# Patient Record
Sex: FEMALE | Race: WHITE | NOT HISPANIC OR LATINO | Employment: FULL TIME | URBAN - METROPOLITAN AREA
[De-identification: names, ages, dates, MRNs, and addresses within clinical notes are randomized per-mention and may not be internally consistent; named-entity substitution may affect disease eponyms.]

---

## 2019-02-05 ENCOUNTER — HOSPITAL ENCOUNTER (OUTPATIENT)
Dept: PERIOP | Facility: HOSPITAL | Age: 29
Setting detail: HOSPITAL OUTPATIENT SURGERY
Discharge: HOME | End: 2019-02-05
Attending: OBSTETRICS & GYNECOLOGY

## 2019-02-05 LAB — HCG UR QL: NEGATIVE

## 2019-09-20 ENCOUNTER — HOSPITAL ENCOUNTER (OUTPATIENT)
Dept: URGENT CARE | Facility: CLINIC | Age: 29
Discharge: HOME | End: 2019-09-20
Attending: EMERGENCY MEDICINE

## 2021-09-14 ENCOUNTER — TRANSCRIBE ORDERS (OUTPATIENT)
Dept: LAB | Facility: HOSPITAL | Age: 31
End: 2021-09-14

## 2021-09-14 ENCOUNTER — LAB (OUTPATIENT)
Dept: LAB | Facility: HOSPITAL | Age: 31
End: 2021-09-14

## 2021-09-14 DIAGNOSIS — Z01.818 PRE-OP TESTING: ICD-10-CM

## 2021-09-14 DIAGNOSIS — Z01.818 PRE-OP TESTING: Primary | ICD-10-CM

## 2021-09-14 PROCEDURE — U0004 COV-19 TEST NON-CDC HGH THRU: HCPCS

## 2021-09-15 LAB — SARS-COV-2 RNA NOSE QL NAA+PROBE: NOT DETECTED

## 2022-02-28 ENCOUNTER — TELEMEDICINE (OUTPATIENT)
Dept: FAMILY MEDICINE CLINIC | Facility: TELEHEALTH | Age: 32
End: 2022-02-28

## 2022-02-28 DIAGNOSIS — J30.2 SEASONAL ALLERGIC RHINITIS, UNSPECIFIED TRIGGER: ICD-10-CM

## 2022-02-28 DIAGNOSIS — B00.1 COLD SORE: Primary | ICD-10-CM

## 2022-02-28 PROCEDURE — 99213 OFFICE O/P EST LOW 20 MIN: CPT | Performed by: NURSE PRACTITIONER

## 2022-02-28 RX ORDER — METRONIDAZOLE 500 MG/1
TABLET ORAL
COMMUNITY
Start: 2022-02-25 | End: 2022-06-20

## 2022-02-28 RX ORDER — VALACYCLOVIR HYDROCHLORIDE 1 G/1
TABLET, FILM COATED ORAL
Qty: 4 TABLET | Refills: 1 | Status: SHIPPED | OUTPATIENT
Start: 2022-02-28 | End: 2022-06-20

## 2022-02-28 RX ORDER — QUETIAPINE FUMARATE 25 MG/1
TABLET, FILM COATED ORAL
COMMUNITY
Start: 2022-01-27 | End: 2022-06-20

## 2022-02-28 RX ORDER — FLUTICASONE PROPIONATE 50 MCG
2 SPRAY, SUSPENSION (ML) NASAL DAILY
Qty: 16 G | Refills: 0 | Status: SHIPPED | OUTPATIENT
Start: 2022-02-28 | End: 2022-06-20

## 2022-02-28 NOTE — PATIENT INSTRUCTIONS
Cold Sore    A cold sore, also called a fever blister, is a small, fluid-filled sore that forms inside the mouth or on the lips, gums, nose, chin, or cheeks. Cold sores can spread to other parts of the body, such as the eyes or fingers. In some people who have other medical conditions, cold sores can spread to multiple other body sites, including the genitals.  Cold sores can spread from person to person (are contagious) until the sores crust over completely. Most cold sores go away within 2 weeks.  What are the causes?  Cold sores are caused by an infection from a common type of herpes simplex virus (HSV-1). HSV-1 is closely related to the HSV-2virus, which is the virus that causes genital herpes, but these viruses are not the same. Once a person is infected with HSV-1, the virus remains permanently in the body.  HSV-1 is spread from person to person through close contact, such as through kissing, touching the affected area, or sharing personal items such as lip balm, razors, a drinking glass, or eating utensils.  What increases the risk?  You are more likely to develop this condition if you:  · Are tired, stressed, or sick.  · Are menstruating.  · Are pregnant.  · Take certain medicines.  · Are exposed to cold weather or too much sun.  What are the signs or symptoms?  Symptoms of a cold sore outbreak go through different stages. These are the stages of a cold sore:  · Tingling, itching, or burning is felt 1-2 days before the outbreak.  · Fluid-filled blisters appear on the lips, inside the mouth, on the nose, or on the cheeks.  · The blisters start to ooze clear fluid.  · The blisters dry up, and a yellow crust appears in their place.  · The crust falls off.  In some cases, other symptoms can develop during a cold sore outbreak. These can include:  · Fever.  · Sore throat.  · Headache.  · Muscle aches.  · Swollen neck glands.  How is this diagnosed?  This condition is diagnosed based on your medical history and a  physical exam. Your health care provider may do a blood test or may swab some fluid from your sore and then examine the swab in the lab.  How is this treated?  There is no cure for cold sores or HSV-1. There is also no vaccine for HSV-1. Most cold sores go away on their own without treatment within 2 weeks. Medicines cannot make the infection go away, but your health care provider may prescribe medicines to:  · Help relieve some of the pain associated with the sores.  · Work to stop the virus from multiplying.  · Shorten healing time.  Medicines may be in the form of creams, gels, pills, or a shot.  Follow these instructions at home:  Medicines  · Take or apply over-the-counter and prescription medicines only as told by your health care provider.  · Use a cotton-tip swab to apply creams or gels to your sores.  · Ask your health care provider if you can take lysine supplements. Research has found that lysine may help heal the cold sore faster and prevent outbreaks.  Sore care    · Do not touch the sores or pick the scabs.  · Wash your hands often. Do not touch your eyes without washing your hands first.  · Keep the sores clean and dry.  · If directed, apply ice to the sores:  ? Put ice in a plastic bag.  ? Place a towel between your skin and the bag.  ? Leave the ice on for 20 minutes, 2-3 times a day.    Eating and drinking  · Eat a soft, bland diet. Avoid eating hot, cold, or salty foods.  · Use a straw if it hurts to drink out of a glass.  · Eat foods that are rich in lysine, such as meat, fish, and dairy products.  · Avoid sugary foods, chocolates, nuts, and grains. These foods are rich in a nutrient called arginine, which can cause the virus to multiply.  Lifestyle  · Do not kiss, have oral sex, or share personal items until your sores heal.  · Stress, poor sleep, and being out in the sun can trigger outbreaks. Make sure you:  ? Do activities that help you relax, such as deep breathing exercises or  meditation.  ? Get enough sleep.  ? Apply sunscreen on your lips before you go out in the sun.  Contact a health care provider if:  · You have symptoms for more than 2 weeks.  · You have pus coming from the sores.  · You have redness that is spreading.  · You have pain or irritation in your eye.  · You get sores on your genitals.  · Your sores do not heal within 2 weeks.  · You have frequent cold sore outbreaks.  Get help right away if you have:  · A fever and your symptoms suddenly get worse.  · A headache and confusion.  · Fatigue or loss of appetite.  · A stiff neck or sensitivity to light.  Summary  · A cold sore, also called a fever blister, is a small, fluid-filled sore that forms inside the mouth or on the lips, gums, nose, chin, or cheeks.  · Most cold sores go away on their own without treatment within 2 weeks. Your health care provider may prescribe medicines to help relieve some of the pain, work to stop the virus from multiplying, and shorten healing time.  · Wash your hands often. Do not touch your eyes without washing your hands first.  · Do not kiss, have oral sex, or share personal items until your sores heal.  · Contact a health care provider if your sores do not heal within 2 weeks.  This information is not intended to replace advice given to you by your health care provider. Make sure you discuss any questions you have with your health care provider.  Document Revised: 04/08/2020 Document Reviewed: 05/20/2019  [employer] Patient Education © 2021 [employer]      Postnasal Drip  Postnasal drip is the feeling of mucus going down the back of your throat. Mucus is a slimy substance that moistens and cleans your nose and throat, as well as the air pockets in face bones near your forehead and cheeks (sinuses). Small amounts of mucus pass from your nose and sinuses down the back of your throat all the time. This is normal. When you produce too much mucus or the mucus gets too thick, you can feel  it.  Some common causes of postnasal drip include:  · Having more mucus because of:  ? A cold or the flu.  ? Allergies.  ? Cold air.  ? Certain medicines.  · Having more mucus that is thicker because of:  ? A sinus or nasal infection.  ? Dry air.  ? A food allergy.  Follow these instructions at home:  Relieving discomfort    · Gargle with a salt-water mixture 3-4 times a day or as needed. To make a salt-water mixture, completely dissolve ½-1 tsp of salt in 1 cup of warm water.  · If the air in your home is dry, use a humidifier to add moisture to the air.  · Use a saline spray or container (neti pot) to flush out the nose (nasal irrigation). These methods can help clear away mucus and keep the nasal passages moist.    General instructions  · Take over-the-counter and prescription medicines only as told by your health care provider.  · Follow instructions from your health care provider about eating or drinking restrictions. You may need to avoid caffeine.  · Avoid things that you know you are allergic to (allergens), like dust, mold, pollen, pets, or certain foods.  · Drink enough fluid to keep your urine pale yellow.  · Keep all follow-up visits as told by your health care provider. This is important.  Contact a health care provider if:  · You have a fever.  · You have a sore throat.  · You have difficulty swallowing.  · You have headache.  · You have sinus pain.  · You have a cough that does not go away.  · The mucus from your nose becomes thick and is green or yellow in color.  · You have cold or flu symptoms that last more than 10 days.  Summary  · Postnasal drip is the feeling of mucus going down the back of your throat.  · If your health care provider approves, use nasal irrigation or a nasal spray 2?4 times a day.  · Avoid things that you know you are allergic to (allergens), like dust, mold, pollen, pets, or certain foods.  This information is not intended to replace advice given to you by your health care  provider. Make sure you discuss any questions you have with your health care provider.  Document Revised: 04/22/2021 Document Reviewed: 04/02/2018  [employer] Patient Education © 2021 [employer]

## 2022-02-28 NOTE — PROGRESS NOTES
Mode of Visit: Video  Location of patient: home  You have chosen to receive care through a telehealth visit.  The patient has signed the video visit consent form.  The visit included audio and video interaction. No technical issues occurred during this visit.     Chief Complaint  Mouth Lesions and Nasal Congestion    Subjective          Tanika Ash presents to [facility]  Cold sore that presented yesterday with a prodrome of tingling and irritation. She started using Abreval yesterday, once in the afternoon. She found some mupirocin that she had from a previous condition and started using it. She has not had any relief. She has had some nasal congestion with postnasal drainage and feels like it is causing sore throat. She takes cetirizine daily for allergies.    Mouth Lesions   The current episode started yesterday. The problem has been gradually worsening. Nothing relieves the symptoms. Associated symptoms include a fever (for 2 night but resolved 2 days ago), congestion, mouth sores, rhinorrhea and sore throat.     Objective   Vital Signs:   There were no vitals taken for this visit.    Physical Exam   HENT:   Mouth/Throat: Oral lesions present.         Result Review :                 Assessment and Plan    Diagnoses and all orders for this visit:    1. Cold sore (Primary)  -     valACYclovir (Valtrex) 1000 MG tablet; Take 2 tabs every 12 hours x 2 doses  Dispense: 4 tablet; Refill: 1    2. Seasonal allergic rhinitis, unspecified trigger  -     fluticasone (Flonase) 50 MCG/ACT nasal spray; 2 sprays into the nostril(s) as directed by provider Daily for 30 days. Administer 2 sprays in each nostril for each dose.  Dispense: 16 g; Refill: 0      Continue Abreva, discontinue Mupirocin. See patient instructions.        I spent 20 minutes caring for Tanika on this date of service. This time includes time spent by me in the following activities:preparing for the visit, obtaining  and/or reviewing a separately obtained history, performing a medically appropriate examination and/or evaluation , counseling and educating the patient/family/caregiver, ordering medications, tests, or procedures, and documenting information in the medical record  Follow Up   Return if symptoms worsen or fail to improve.  Patient was given instructions and counseling regarding her condition or for health maintenance advice. Please see specific information pulled into the AVS if appropriate.

## 2022-06-13 ENCOUNTER — TRANSCRIBE ORDERS (OUTPATIENT)
Dept: ADMINISTRATIVE | Facility: HOSPITAL | Age: 32
End: 2022-06-13

## 2022-06-13 DIAGNOSIS — O26.841 UTERINE SIZE-DATE DISCREPANCY IN FIRST TRIMESTER: Primary | ICD-10-CM

## 2022-06-15 ENCOUNTER — HOSPITAL ENCOUNTER (EMERGENCY)
Facility: HOSPITAL | Age: 32
Discharge: HOME | End: 2022-06-15
Attending: EMERGENCY MEDICINE | Admitting: EMERGENCY MEDICINE

## 2022-06-15 ENCOUNTER — APPOINTMENT (OUTPATIENT)
Dept: ULTRASOUND IMAGING | Facility: HOSPITAL | Age: 32
End: 2022-06-15

## 2022-06-15 VITALS
DIASTOLIC BLOOD PRESSURE: 73 MMHG | TEMPERATURE: 98.6 F | HEIGHT: 62 IN | BODY MASS INDEX: 22.6 KG/M2 | RESPIRATION RATE: 20 BRPM | WEIGHT: 122.8 LBS | SYSTOLIC BLOOD PRESSURE: 101 MMHG | HEART RATE: 78 BPM | OXYGEN SATURATION: 99 %

## 2022-06-15 DIAGNOSIS — O03.4 INCOMPLETE MISCARRIAGE: Primary | ICD-10-CM

## 2022-06-15 LAB
ABO GROUP BLD: NORMAL
HCG INTACT+B SERPL-ACNC: NORMAL MIU/ML
HOLD SPECIMEN: NORMAL
HOLD SPECIMEN: NORMAL
RH BLD: POSITIVE
WHOLE BLOOD HOLD COAG: NORMAL
WHOLE BLOOD HOLD SPECIMEN: NORMAL

## 2022-06-15 PROCEDURE — 99282 EMERGENCY DEPT VISIT SF MDM: CPT

## 2022-06-15 PROCEDURE — 36415 COLL VENOUS BLD VENIPUNCTURE: CPT | Performed by: EMERGENCY MEDICINE

## 2022-06-15 PROCEDURE — 84702 CHORIONIC GONADOTROPIN TEST: CPT | Performed by: EMERGENCY MEDICINE

## 2022-06-15 PROCEDURE — 86900 BLOOD TYPING SEROLOGIC ABO: CPT | Performed by: NURSE PRACTITIONER

## 2022-06-15 PROCEDURE — 76817 TRANSVAGINAL US OBSTETRIC: CPT

## 2022-06-15 PROCEDURE — 86901 BLOOD TYPING SEROLOGIC RH(D): CPT | Performed by: NURSE PRACTITIONER

## 2022-06-15 NOTE — ED PROVIDER NOTES
Subjective   Patient presents to the emergency department today after being advised to do so by staff at [facility] where she was seen earlier for a ultrasound.  She was advised by the ultrasound tech that she did not detect a fetal heart beat.  Patient states that she just saw her OB on Friday who did detect a heartbeat.  She has not had any pain, cramping, or vaginal bleeding.  This is her second pregnancy.  Her first pregnancy was normal.      History provided by:  Patient   used: No        Review of Systems   Constitutional: Negative for chills and fever.   HENT: Negative for congestion, ear pain, rhinorrhea and sore throat.    Eyes: Negative for pain.   Respiratory: Negative for cough and shortness of breath.    Cardiovascular: Negative for chest pain.   Gastrointestinal: Negative for abdominal pain, diarrhea, nausea and vomiting.   Genitourinary: Negative for decreased urine volume, dysuria and flank pain.   Musculoskeletal: Negative for arthralgias and myalgias.   Skin: Negative for rash.   Neurological: Negative for seizures and headaches.   All other systems reviewed and are negative.      Past Medical History:   Diagnosis Date   • Allergic        Allergies   Allergen Reactions   • Augmentin [Amoxicillin-Pot Clavulanate] GI Intolerance       Past Surgical History:   Procedure Laterality Date   • CERVICAL CONIZATION     • KNEE ARTHROSCOPY Right     x 2   • LEEP         Family History   Problem Relation Age of Onset   • Diabetes Mother        Social History     Socioeconomic History   • Marital status: Single   Tobacco Use   • Smoking status: Former Smoker   • Smokeless tobacco: Never Used   Vaping Use   • Vaping Use: Former   Substance and Sexual Activity   • Alcohol use: Not Currently   • Drug use: Not Currently     Types: Marijuana     Comment: occ           Objective   Physical Exam  Vitals and nursing note reviewed.   Constitutional:       General: She is not in acute distress.      Appearance: Normal appearance. She is normal weight. She is not ill-appearing, toxic-appearing or diaphoretic.   HENT:      Head: Normocephalic and atraumatic.      Right Ear: External ear normal.      Left Ear: External ear normal.   Eyes:      General: No scleral icterus.     Conjunctiva/sclera: Conjunctivae normal.      Pupils: Pupils are equal, round, and reactive to light.   Cardiovascular:      Rate and Rhythm: Normal rate.   Pulmonary:      Effort: Pulmonary effort is normal. No respiratory distress.   Abdominal:      General: There is no distension.      Tenderness: There is no abdominal tenderness.   Musculoskeletal:         General: No swelling, tenderness, deformity or signs of injury. Normal range of motion.      Cervical back: Normal range of motion and neck supple.   Skin:     General: Skin is warm and dry.      Capillary Refill: Capillary refill takes less than 2 seconds.   Neurological:      General: No focal deficit present.      Mental Status: She is alert and oriented to person, place, and time.   Psychiatric:         Mood and Affect: Mood normal.         Behavior: Behavior normal.         Procedures           ED Course                                                 MDM  Number of Diagnoses or Management Options  Incomplete miscarriage: new and requires workup     Amount and/or Complexity of Data Reviewed  Clinical lab tests: reviewed  Tests in the radiology section of CPT®: reviewed and ordered  Independent visualization of images, tracings, or specimens: yes    Risk of Complications, Morbidity, and/or Mortality  Presenting problems: moderate  Diagnostic procedures: moderate  Management options: minimal  General comments: Patient was advised of her ultrasound results and told what to expect during her miscarriage.  She advises that she will call doctor Eng's office tomorrow to discuss the results and schedule an appointment.     Patient Progress  Patient progress: stable      Final diagnoses:    Incomplete miscarriage       ED Disposition  ED Disposition     ED Disposition   Discharge    Condition   Stable    Comment   --             Tammy Eng MD  [address]  [place] [id number]  [phone]    Schedule an appointment as soon as possible for a visit       Boston Luke MD  [address]  [facility] [address]  [phone]      As needed         Medication List      No changes were made to your prescriptions during this visit.          Yesenia Lucio, APRN  06/15/22 1495

## 2022-06-15 NOTE — ED NOTES
Pt reports that at her ultrasound today that they were unable to get the heart beat. She states that she has not had any bleeding.

## 2022-06-17 ENCOUNTER — PREP FOR SURGERY (OUTPATIENT)
Dept: OTHER | Facility: HOSPITAL | Age: 32
End: 2022-06-17

## 2022-06-17 ENCOUNTER — LAB (OUTPATIENT)
Dept: LAB | Facility: HOSPITAL | Age: 32
End: 2022-06-17

## 2022-06-17 DIAGNOSIS — O03.9 SAB (SPONTANEOUS ABORTION): Primary | ICD-10-CM

## 2022-06-17 PROCEDURE — U0004 COV-19 TEST NON-CDC HGH THRU: HCPCS

## 2022-06-18 LAB — SARS-COV-2 RNA PNL SPEC NAA+PROBE: NOT DETECTED

## 2022-06-21 ENCOUNTER — ANESTHESIA (OUTPATIENT)
Dept: PERIOP | Facility: HOSPITAL | Age: 32
End: 2022-06-21

## 2022-06-21 ENCOUNTER — ANESTHESIA EVENT (OUTPATIENT)
Dept: PERIOP | Facility: HOSPITAL | Age: 32
End: 2022-06-21

## 2022-06-21 ENCOUNTER — HOSPITAL ENCOUNTER (OUTPATIENT)
Facility: HOSPITAL | Age: 32
Setting detail: HOSPITAL OUTPATIENT SURGERY
Discharge: HOME | End: 2022-06-21
Attending: OBSTETRICS & GYNECOLOGY | Admitting: OBSTETRICS & GYNECOLOGY

## 2022-06-21 VITALS
HEIGHT: 62 IN | OXYGEN SATURATION: 97 % | HEART RATE: 72 BPM | WEIGHT: 130.95 LBS | TEMPERATURE: 97.1 F | DIASTOLIC BLOOD PRESSURE: 59 MMHG | BODY MASS INDEX: 24.1 KG/M2 | RESPIRATION RATE: 16 BRPM | SYSTOLIC BLOOD PRESSURE: 104 MMHG

## 2022-06-21 DIAGNOSIS — O03.9 SAB (SPONTANEOUS ABORTION): ICD-10-CM

## 2022-06-21 PROCEDURE — 25010000002 ONDANSETRON PER 1 MG: Performed by: NURSE ANESTHETIST, CERTIFIED REGISTERED

## 2022-06-21 PROCEDURE — 25010000002 DEXAMETHASONE PER 1 MG: Performed by: NURSE ANESTHETIST, CERTIFIED REGISTERED

## 2022-06-21 PROCEDURE — 88305 TISSUE EXAM BY PATHOLOGIST: CPT | Performed by: OBSTETRICS & GYNECOLOGY

## 2022-06-21 PROCEDURE — 25010000002 PROPOFOL 10 MG/ML EMULSION: Performed by: NURSE ANESTHETIST, CERTIFIED REGISTERED

## 2022-06-21 PROCEDURE — 25010000002 KETOROLAC TROMETHAMINE PER 15 MG: Performed by: NURSE ANESTHETIST, CERTIFIED REGISTERED

## 2022-06-21 PROCEDURE — 0 LIDOCAINE 1 % SOLUTION: Performed by: OBSTETRICS & GYNECOLOGY

## 2022-06-21 PROCEDURE — 25010000002 MIDAZOLAM PER 1 MG: Performed by: STUDENT IN AN ORGANIZED HEALTH CARE EDUCATION/TRAINING PROGRAM

## 2022-06-21 PROCEDURE — 25010000002 FENTANYL CITRATE (PF) 50 MCG/ML SOLUTION: Performed by: NURSE ANESTHETIST, CERTIFIED REGISTERED

## 2022-06-21 PROCEDURE — 0 MEPERIDINE PER 100 MG: Performed by: NURSE ANESTHETIST, CERTIFIED REGISTERED

## 2022-06-21 RX ORDER — KETOROLAC TROMETHAMINE 30 MG/ML
INJECTION, SOLUTION INTRAMUSCULAR; INTRAVENOUS AS NEEDED
Status: DISCONTINUED | OUTPATIENT
Start: 2022-06-21 | End: 2022-06-21 | Stop reason: SURG

## 2022-06-21 RX ORDER — PROMETHAZINE HYDROCHLORIDE 25 MG/1
25 SUPPOSITORY RECTAL ONCE AS NEEDED
Status: DISCONTINUED | OUTPATIENT
Start: 2022-06-21 | End: 2022-06-21 | Stop reason: HOSPADM

## 2022-06-21 RX ORDER — ONDANSETRON 2 MG/ML
4 INJECTION INTRAMUSCULAR; INTRAVENOUS ONCE AS NEEDED
Status: DISCONTINUED | OUTPATIENT
Start: 2022-06-21 | End: 2022-06-21 | Stop reason: HOSPADM

## 2022-06-21 RX ORDER — LIDOCAINE HYDROCHLORIDE 10 MG/ML
INJECTION, SOLUTION INFILTRATION; PERINEURAL AS NEEDED
Status: DISCONTINUED | OUTPATIENT
Start: 2022-06-21 | End: 2022-06-21 | Stop reason: HOSPADM

## 2022-06-21 RX ORDER — ONDANSETRON 2 MG/ML
INJECTION INTRAMUSCULAR; INTRAVENOUS AS NEEDED
Status: DISCONTINUED | OUTPATIENT
Start: 2022-06-21 | End: 2022-06-21 | Stop reason: SURG

## 2022-06-21 RX ORDER — ACETAMINOPHEN 500 MG
1000 TABLET ORAL ONCE
Status: COMPLETED | OUTPATIENT
Start: 2022-06-21 | End: 2022-06-21

## 2022-06-21 RX ORDER — LIDOCAINE HYDROCHLORIDE 20 MG/ML
INJECTION, SOLUTION EPIDURAL; INFILTRATION; INTRACAUDAL; PERINEURAL AS NEEDED
Status: DISCONTINUED | OUTPATIENT
Start: 2022-06-21 | End: 2022-06-21 | Stop reason: SURG

## 2022-06-21 RX ORDER — FENTANYL CITRATE 50 UG/ML
INJECTION, SOLUTION INTRAMUSCULAR; INTRAVENOUS AS NEEDED
Status: DISCONTINUED | OUTPATIENT
Start: 2022-06-21 | End: 2022-06-21 | Stop reason: SURG

## 2022-06-21 RX ORDER — IBUPROFEN 600 MG/1
600 TABLET ORAL EVERY 6 HOURS PRN
Qty: 20 TABLET | Refills: 0 | Status: SHIPPED | OUTPATIENT
Start: 2022-06-21 | End: 2022-06-26

## 2022-06-21 RX ORDER — MIDAZOLAM HYDROCHLORIDE 1 MG/ML
2 INJECTION INTRAMUSCULAR; INTRAVENOUS ONCE
Status: COMPLETED | OUTPATIENT
Start: 2022-06-21 | End: 2022-06-21

## 2022-06-21 RX ORDER — SCOLOPAMINE TRANSDERMAL SYSTEM 1 MG/1
1 PATCH, EXTENDED RELEASE TRANSDERMAL ONCE
Status: DISCONTINUED | OUTPATIENT
Start: 2022-06-21 | End: 2022-06-21 | Stop reason: HOSPADM

## 2022-06-21 RX ORDER — MEPERIDINE HYDROCHLORIDE 25 MG/ML
12.5 INJECTION INTRAMUSCULAR; INTRAVENOUS; SUBCUTANEOUS
Status: DISCONTINUED | OUTPATIENT
Start: 2022-06-21 | End: 2022-06-21 | Stop reason: HOSPADM

## 2022-06-21 RX ORDER — OXYCODONE HYDROCHLORIDE 5 MG/1
5 TABLET ORAL
Status: DISCONTINUED | OUTPATIENT
Start: 2022-06-21 | End: 2022-06-21 | Stop reason: HOSPADM

## 2022-06-21 RX ORDER — PROPOFOL 10 MG/ML
VIAL (ML) INTRAVENOUS AS NEEDED
Status: DISCONTINUED | OUTPATIENT
Start: 2022-06-21 | End: 2022-06-21 | Stop reason: SURG

## 2022-06-21 RX ORDER — DEXTROSE MONOHYDRATE 25 G/50ML
INJECTION, SOLUTION INTRAVENOUS AS NEEDED
Status: DISCONTINUED | OUTPATIENT
Start: 2022-06-21 | End: 2022-06-21 | Stop reason: SURG

## 2022-06-21 RX ORDER — KETAMINE HCL IN NACL, ISO-OSM 100MG/10ML
SYRINGE (ML) INJECTION AS NEEDED
Status: DISCONTINUED | OUTPATIENT
Start: 2022-06-21 | End: 2022-06-21 | Stop reason: SURG

## 2022-06-21 RX ORDER — SODIUM CHLORIDE, SODIUM LACTATE, POTASSIUM CHLORIDE, CALCIUM CHLORIDE 600; 310; 30; 20 MG/100ML; MG/100ML; MG/100ML; MG/100ML
9 INJECTION, SOLUTION INTRAVENOUS CONTINUOUS PRN
Status: DISCONTINUED | OUTPATIENT
Start: 2022-06-21 | End: 2022-06-21 | Stop reason: HOSPADM

## 2022-06-21 RX ORDER — PROMETHAZINE HYDROCHLORIDE 12.5 MG/1
25 TABLET ORAL ONCE AS NEEDED
Status: DISCONTINUED | OUTPATIENT
Start: 2022-06-21 | End: 2022-06-21 | Stop reason: HOSPADM

## 2022-06-21 RX ORDER — DEXAMETHASONE SODIUM PHOSPHATE 4 MG/ML
INJECTION, SOLUTION INTRA-ARTICULAR; INTRALESIONAL; INTRAMUSCULAR; INTRAVENOUS; SOFT TISSUE AS NEEDED
Status: DISCONTINUED | OUTPATIENT
Start: 2022-06-21 | End: 2022-06-21 | Stop reason: SURG

## 2022-06-21 RX ORDER — OXYTOCIN 10 [USP'U]/ML
INJECTION, SOLUTION INTRAMUSCULAR; INTRAVENOUS AS NEEDED
Status: DISCONTINUED | OUTPATIENT
Start: 2022-06-21 | End: 2022-06-21 | Stop reason: SURG

## 2022-06-21 RX ADMIN — DEXAMETHASONE SODIUM PHOSPHATE 8 MG: 4 INJECTION, SOLUTION INTRA-ARTICULAR; INTRALESIONAL; INTRAMUSCULAR; INTRAVENOUS; SOFT TISSUE at 15:50

## 2022-06-21 RX ADMIN — FENTANYL CITRATE 50 MCG: 50 INJECTION, SOLUTION INTRAMUSCULAR; INTRAVENOUS at 15:51

## 2022-06-21 RX ADMIN — Medication 25 MG: at 16:00

## 2022-06-21 RX ADMIN — DEXTROSE MONOHYDRATE 12.5 G: 500 INJECTION PARENTERAL at 15:50

## 2022-06-21 RX ADMIN — ONDANSETRON 4 MG: 2 INJECTION INTRAMUSCULAR; INTRAVENOUS at 15:50

## 2022-06-21 RX ADMIN — LIDOCAINE HYDROCHLORIDE 100 MG: 20 INJECTION, SOLUTION EPIDURAL; INFILTRATION; INTRACAUDAL; PERINEURAL at 15:51

## 2022-06-21 RX ADMIN — ACETAMINOPHEN 1000 MG: 500 TABLET ORAL at 09:37

## 2022-06-21 RX ADMIN — SCOPALAMINE 1 PATCH: 1 PATCH, EXTENDED RELEASE TRANSDERMAL at 09:38

## 2022-06-21 RX ADMIN — PROPOFOL 20 MG: 10 INJECTION, EMULSION INTRAVENOUS at 15:57

## 2022-06-21 RX ADMIN — KETOROLAC TROMETHAMINE 30 MG: 30 INJECTION, SOLUTION INTRAMUSCULAR; INTRAVENOUS at 15:50

## 2022-06-21 RX ADMIN — MIDAZOLAM HYDROCHLORIDE 2 MG: 1 INJECTION, SOLUTION INTRAMUSCULAR; INTRAVENOUS at 15:31

## 2022-06-21 RX ADMIN — FENTANYL CITRATE 50 MCG: 50 INJECTION, SOLUTION INTRAMUSCULAR; INTRAVENOUS at 16:17

## 2022-06-21 RX ADMIN — OXYTOCIN 10 UNITS: 10 INJECTION, SOLUTION INTRAMUSCULAR; INTRAVENOUS at 16:46

## 2022-06-21 RX ADMIN — PROPOFOL 60 MG: 10 INJECTION, EMULSION INTRAVENOUS at 16:20

## 2022-06-21 RX ADMIN — MEPERIDINE HYDROCHLORIDE 12.5 MG: 25 INJECTION INTRAMUSCULAR; INTRAVENOUS; SUBCUTANEOUS at 17:15

## 2022-06-21 RX ADMIN — PROPOFOL 250 MCG/KG/MIN: 10 INJECTION, EMULSION INTRAVENOUS at 15:51

## 2022-06-21 RX ADMIN — SODIUM CHLORIDE, POTASSIUM CHLORIDE, SODIUM LACTATE AND CALCIUM CHLORIDE 9 ML/HR: 600; 310; 30; 20 INJECTION, SOLUTION INTRAVENOUS at 09:37

## 2022-06-21 RX ADMIN — PROPOFOL 50 MG: 10 INJECTION, EMULSION INTRAVENOUS at 15:51

## 2022-06-21 RX ADMIN — Medication 25 MG: at 15:51

## 2022-06-21 NOTE — DISCHARGE INSTRUCTIONS
DISCHARGE INSTRUCTIONS  GYNECOLOGICAL  PROCEDURES      For your surgery you had:  General anesthesia (you may have a sore throat for the first 24 hours)  You received a medicated patch for nausea prevention today (behind your ear). It is recommended that you remove it 24-48 hours post-operatively. It must be removed within 72 hours.  You may experience dizziness, drowsiness, or lightheadedness for several hours following surgery.  Do not stay alone today or tonight.  Limit your activity for 24 hours.  Resume your diet slowly.  Follow any special dietary instructions you may have been given by your doctor.  You should not drive or operate machinery, drink alcohol, or sign legally binding documents for 24 hours or while you are taking pain medication.    NOTIFY YOUR DOCTOR IF YOU EXPERIENCE ANY OF THE FOLLOWING:  Temperature greater than 101 degrees Fahrenheit  Shaking Chills  Redness or excessive drainage from incision  Nausea, vomiting and/or pain that is not controlled by prescribed medications  Increase in bleeding or bleeding that is excessive  Unable to urinate in 6 hours after surgery  If unable to reach your doctor, please go to the closest Emergency Room [x] You may resume intercourse and the use of tampons as your physician has instructed you.  [x] Vaginal bleeding may be expected for several days with flow decreasing with time and never any heavier than a normal   period.  If you have foul smelling discharge, notify your physician.  Medications per physician instructions as indicated on Discharge Medication Information Sheet.      SPECIAL INSTRUCTIONS:             Last dose of pain medication was given at:  Tylenol (1000mg) last at 9:35am, may take tylenol next at any time if needed. Do not exceed 4000mg of tylenol in a 24 hour period.  Toradol last at 3:50pm, may take ibuprofen next at 9:50pm if needed.

## 2022-06-21 NOTE — ANESTHESIA POSTPROCEDURE EVALUATION
Patient: Tanika Ash    Procedure Summary     Date: 22 Room / Location: [facility] OR  [facility] MAIN OR    Anesthesia Start: 154 Anesthesia Stop:     Procedure: DILATATION AND CURETTAGE suction (N/A Vagina) Diagnosis:       SAB (spontaneous )      (SAB (spontaneous ) [O03.9])    Surgeons: Tammy Eng MD Provider: Anita Hallman MD    Anesthesia Type: general ASA Status: 1          Anesthesia Type: general    Vitals  Vitals Value Taken Time   BP 99/70 22 1736   Temp 36.4 °C (97.6 °F) 22 1730   Pulse 74 22 1738   Resp 15 22 1735   SpO2 93 % 22 1738   Vitals shown include unvalidated device data.        Post Anesthesia Care and Evaluation    Patient location during evaluation: bedside  Patient participation: complete - patient participated  Level of consciousness: awake  Pain score: 0  Pain management: adequate    Airway patency: patent  Anesthetic complications: No anesthetic complications  PONV Status: none  Cardiovascular status: acceptable and stable  Respiratory status: acceptable and room air  Hydration status: acceptable    Comments: An Anesthesiologist personally participated in the most demanding procedures (including induction and emergence if applicable) in the anesthesia plan, monitored the course of anesthesia administration at frequent intervals and remained physically present and available for immediate diagnosis and treatment of emergencies.

## 2022-06-21 NOTE — SIGNIFICANT NOTE
06/21/22 0957   Coping/Psychosocial   Observed Emotional State calm;quiet   Verbalized Emotional State acceptance   Trust Relationship/Rapport care explained;choices provided   Family/Support Persons significant other   Involvement in Care at bedside   Family/Support System Care support provided   Spiritual Care   Spiritual Care Visit Type initial   Spiritual Care Source  initiative;nurse referral   Receptivity to Spiritual Care visit welcomed   Spiritual Care Request loss support   Spiritual Care Interventions supportive conversation provided   Response to Spiritual Care thanks expressed   Use of Spiritual Resources non-Hinduism use of spiritual care   Spiritual Care Follow-Up follow-up, none required as presently assessed   Nurse referred pt to  for support and options for what to do with the babies remains. Visit welcomed. Parents decided they would like to cremate the baby and have the remains given to them. Supported conversation and decision.

## 2022-06-21 NOTE — ANESTHESIA PREPROCEDURE EVALUATION
Anesthesia Evaluation     Patient summary reviewed and Nursing notes reviewed   history of anesthetic complications: PONV  NPO Solid Status: > 8 hours  NPO Liquid Status: > 2 hours           Airway   Mallampati: II  TM distance: >3 FB  No difficulty expected  Dental - normal exam     Pulmonary - normal exam   (+) a smoker Former,   Cardiovascular - negative cardio ROS and normal exam  Exercise tolerance: good (4-7 METS)        Neuro/Psych- negative ROS  GI/Hepatic/Renal/Endo - negative ROS     Musculoskeletal (-) negative ROS    Abdominal  - normal exam   Substance History - negative use     OB/GYN    (+) Pregnant (spontaneous ),         Other - negative ROS       ROS/Med Hx Other: 11 weeks gestation                Anesthesia Plan    ASA 1     general     (Patient understands anesthesia not responsible for dental damage.)  intravenous induction     Anesthetic plan, risks, benefits, and alternatives have been provided, discussed and informed consent has been obtained with: patient.    Plan discussed with CRNA.        CODE STATUS:

## 2022-06-21 NOTE — OP NOTE
DILATATION AND CURETTAGE  Procedure Report    Patient Name:  Tanika Ash  YOB: 1990    Date of Surgery:  2022     Indications: SAB    Pre-op Diagnosis:   SAB (spontaneous ) [O03.9]       Post-Op Diagnosis Codes:     * SAB (spontaneous ) [O03.9]    Procedure/CPT® Codes:      Procedure(s):  DILATATION AND CURETTAGE suction    Staff:  Surgeon(s):  Tammy Eng MD         Anesthesia: Monitored Anesthesia Care    Estimated Blood Loss: 30 mL    Implants:    Nothing was implanted during the procedure    Specimen:          Specimens     ID Source Type Tests Collected By Collected At Frozen?    A Products of Conception Tissue · TISSUE PATHOLOGY EXAM   Tammy Eng MD 22 [id number]     This specimen was not marked as sent.              Findings: Uterus sounds to approximately 9 cm, products of conception removed from the uterine cavity.  Cervix stenotic    Complications: None    Description of Procedure: The appropriate consents were obtained.  The patient was taken to the operating suite.  Anesthesia was administered.  She was placed in dorsal lithotomy position utilizing candycane stirrups.  She was prepped and draped in the usual sterile fashion and had and out catheterization of her bladder.  Time out procedures were carried out.  She had a pelvic exam under anesthesia.  Findings as noted above.  The speculum was placed.  A single tooth tenaculum was placed.  The depth of the uterus was measured.  The appropriate size curet was selected.  The cervical  os was dilated to accommodate the curette.  Suction and sharp curette were alternated to assure complete evacuation of the uterus.  The tenaculum was removed.  Hemostasis at the tenaculum site with digital pressure.  The remainder of the estimates were removed.  The specimen was prepared for transport to pathology for analysis.  The patient was left in stable condition awaiting transport to the  PACU.          Tammy Eng MD     Date: 6/21/2022  Time: 18:01 EDT

## 2022-06-23 LAB
CYTO UR: NORMAL
LAB AP CASE REPORT: NORMAL
LAB AP CLINICAL INFORMATION: NORMAL
PATH REPORT.FINAL DX SPEC: NORMAL
PATH REPORT.GROSS SPEC: NORMAL

## 2022-06-29 ENCOUNTER — TELEPHONE (OUTPATIENT)
Dept: LACTATION | Facility: HOSPITAL | Age: 32
End: 2022-06-29

## 2022-06-29 NOTE — TELEPHONE ENCOUNTER
Nurse was able to discuss this patient's feelings around her recent pregnancy loss and she has reached out to her doctor and boyfriend and seems to be coping well but states she is grieving the loss. Nurse provided information on how  to contact her should she have more needs or questions.

## 2022-07-06 ENCOUNTER — APPOINTMENT (OUTPATIENT)
Dept: ULTRASOUND IMAGING | Facility: HOSPITAL | Age: 32
End: 2022-07-06

## 2022-08-07 ENCOUNTER — HOSPITAL ENCOUNTER (EMERGENCY)
Facility: HOSPITAL | Age: 32
Discharge: HOME | End: 2022-08-07
Attending: EMERGENCY MEDICINE | Admitting: EMERGENCY MEDICINE

## 2022-08-07 VITALS
HEIGHT: 62 IN | HEART RATE: 76 BPM | RESPIRATION RATE: 16 BRPM | OXYGEN SATURATION: 100 % | WEIGHT: 121.91 LBS | DIASTOLIC BLOOD PRESSURE: 73 MMHG | SYSTOLIC BLOOD PRESSURE: 111 MMHG | TEMPERATURE: 98 F | BODY MASS INDEX: 22.43 KG/M2

## 2022-08-07 DIAGNOSIS — L98.8 SKIN LESION OF BREAST: Primary | ICD-10-CM

## 2022-08-07 PROCEDURE — 99283 EMERGENCY DEPT VISIT LOW MDM: CPT

## 2022-08-07 PROCEDURE — 87593 ORTHOPOXVIRUS AMP PRB EACH: CPT | Performed by: NURSE PRACTITIONER

## 2022-08-08 NOTE — ED PROVIDER NOTES
Time: 22:17 EDT  Arrived by: Private vehicle  Chief Complaint: Skin lesion  History provided by: Patient  History is limited by: N/A    History of Present Illness:  Patient is a 32 y.o. year old female that presents to the emergency department with lesion on breast and patient's boyfriend was sent here to be screened for monkeypox so she needs screening as well since they have had close intimate contact      History provided by:  Patient  Rash  Location:  Torso  Torso rash location:  L breast  Quality: redness    Severity:  Mild  Onset quality:  Unable to specify  Duration:  1 day  Timing:  Constant  Progression:  Unchanged  Chronicity:  New  Context comment:  Patient boyfriend is being tested for monkey box and patient did notice 1 single lesion on her left breast and unsure where it came from  Relieved by:  Nothing  Worsened by:  Nothing  Ineffective treatments:  None tried  Associated symptoms: no abdominal pain, no diarrhea, no fatigue, no fever, no headaches, no hoarse voice, no induration, no joint pain, no myalgias, no nausea, no periorbital edema, no shortness of breath, no sore throat, no throat swelling, no tongue swelling, no URI, not vomiting and not wheezing      Similar Symptoms Previously: No  Recently seen: No      Patient Care Team  Primary Care Provider: Catina Luke    Past Medical History:     Allergies   Allergen Reactions   • Augmentin [Amoxicillin-Pot Clavulanate] GI Intolerance     Past Medical History:   Diagnosis Date   • Allergic    • PONV (postoperative nausea and vomiting)      Past Surgical History:   Procedure Laterality Date   • CERVICAL CONIZATION     • DILATATION AND CURETTAGE N/A 6/21/2022    Procedure: DILATATION AND CURETTAGE suction;  Surgeon: Tammy Eng MD;  Location: [facility] MAIN OR;  Service: Gynecology;  Laterality: N/A;   • KNEE ARTHROSCOPY Right     x 2   • LEEP       Family History   Problem Relation Age of Onset   • Diabetes Mother    • Malig Hyperthermia  "Neg Hx        Home Medications:  Prior to Admission medications    Not on File        Social History:   PT  reports that she has quit smoking. She has never used smokeless tobacco. She reports previous alcohol use. She reports previous drug use. Drug: Marijuana.    Record Review:  I have reviewed the patient's records in [place].     Review of Systems  Review of Systems   Constitutional: Negative for chills, fatigue and fever.   HENT: Negative for congestion, ear pain, hoarse voice and sore throat.    Eyes: Negative for pain.   Respiratory: Negative for cough, chest tightness, shortness of breath and wheezing.    Cardiovascular: Negative for chest pain.   Gastrointestinal: Negative for abdominal pain, diarrhea, nausea and vomiting.   Genitourinary: Negative for flank pain and hematuria.   Musculoskeletal: Negative for arthralgias, joint swelling and myalgias.   Skin: Positive for rash. Negative for pallor.   Neurological: Negative for seizures and headaches.   Hematological: Negative.    Psychiatric/Behavioral: Negative.    All other systems reviewed and are negative.       Physical Exam  /73 (BP Location: Right arm, Patient Position: Sitting)   Pulse 76   Temp 98 °F (36.7 °C) (Oral)   Resp 16   Ht 157.5 cm (62\")   Wt 55.3 kg (121 lb 14.6 oz)   LMP 07/21/2022 (Approximate) Comment: MISSED AB..  SpO2 100%   Breastfeeding Unknown   BMI 22.30 kg/m²     Physical Exam  Vitals and nursing note reviewed.   Constitutional:       Appearance: Normal appearance.   HENT:      Head: Atraumatic.      Nose: Nose normal.      Mouth/Throat:      Pharynx: Oropharynx is clear. No posterior oropharyngeal erythema.   Eyes:      Conjunctiva/sclera: Conjunctivae normal.   Cardiovascular:      Rate and Rhythm: Normal rate and regular rhythm.      Heart sounds: Normal heart sounds.   Pulmonary:      Effort: Pulmonary effort is normal.      Breath sounds: Normal breath sounds.   Musculoskeletal:         General: Normal range of " "motion.      Cervical back: Normal range of motion.   Skin:     General: Skin is warm and dry.      Findings: Lesion ( 1 single small pea-sized maculopapular erythematous lesion to left breast approximately 9:00 in location.  Nontender) present.   Neurological:      Mental Status: She is alert and oriented to person, place, and time.   Psychiatric:         Mood and Affect: Mood normal.         Behavior: Behavior normal.          ED Course  /73 (BP Location: Right arm, Patient Position: Sitting)   Pulse 76   Temp 98 °F (36.7 °C) (Oral)   Resp 16   Ht 157.5 cm (62\")   Wt 55.3 kg (121 lb 14.6 oz)   LMP 07/21/2022 (Approximate) Comment: MISSED AB..  SpO2 100%   Breastfeeding Unknown   BMI 22.30 kg/m²   Results for orders placed or performed during the hospital encounter of 06/21/22   Tissue Pathology Exam    Specimen: Products of Conception; Tissue   Result Value Ref Range    Case Report       Surgical Pathology Report                         Case: [id number]                                  Authorizing Provider:  Tammy Eng MD   Collected:           06/21/2022 04:32 PM          Ordering Location:     [facility] MAIN Received:            06/22/2022 04:05 AM                                 OR                                                                           Pathologist:           Sven Pena MD                                                            Specimen:    Products of Conception                                                                     Clinical Information      Final Diagnosis       Products of conception, removal:   - Chorionic villi present, consistent with products of conception        Gross Description       1. Products of Conception.  Products of conception: Received in formalin in a cloth collection stockinette are irregular fragments of reddish-tan soft tissue measuring 6 cm in greatest aggregate dimension.  No fetal parts are identified grossly.  " Rep 1A-1C.  CRE          Microscopic Description       Medications - No data to display  No results found.    Medical Decision Making:                     MDM  Number of Diagnoses or Management Options  Skin lesion of breast  Diagnosis management comments: I have spoken with the patient. I have explained the patient´s condition, diagnoses and treatment plan based on the information available to me at this time. I have answered the patient's questions and addressed any concerns. The patient has a good  understanding of the patient´s diagnosis, condition, and treatment plan as can be expected at this point. The vital signs have been stable. The patient´s condition is stable and appropriate for discharge from the emergency department.      The patient will pursue further outpatient evaluation with the primary care physician or other designated or consulting physician as outlined in the discharge instructions. They are agreeable to this plan of care and follow-up instructions have been explained in detail. The patient has received these instructions in written format and have expressed an understanding of the discharge instructions. The patient is aware that any significant change in condition or worsening of symptoms should prompt an immediate return to this or the closest emergency department or call to 911.         Amount and/or Complexity of Data Reviewed  Clinical lab tests: ordered    Risk of Complications, Morbidity, and/or Mortality  Presenting problems: minimal  Management options: minimal    Patient Progress  Patient progress: stable       Final diagnoses:   Skin lesion of breast        Disposition:  ED Disposition     ED Disposition   Discharge    Condition   Stable    Comment   --              Megan Reyes, APRN  08/07/22 0757

## 2022-08-12 LAB — ORTHOPOXVIRUS DNA: NOT DETECTED

## 2024-07-19 ENCOUNTER — HOSPITAL ENCOUNTER (EMERGENCY)
Facility: HOSPITAL | Age: 34
Discharge: HOME | End: 2024-07-19
Attending: EMERGENCY MEDICINE
Payer: MEDICAID

## 2024-07-19 VITALS
HEIGHT: 63 IN | HEART RATE: 56 BPM | DIASTOLIC BLOOD PRESSURE: 74 MMHG | WEIGHT: 113.1 LBS | OXYGEN SATURATION: 98 % | TEMPERATURE: 98 F | BODY MASS INDEX: 20.04 KG/M2 | SYSTOLIC BLOOD PRESSURE: 100 MMHG | RESPIRATION RATE: 18 BRPM

## 2024-07-19 DIAGNOSIS — K62.5 RECTAL BLEEDING: Primary | ICD-10-CM

## 2024-07-19 LAB
BACTERIA UR QL AUTO: ABNORMAL /HPF
BASOPHILS # BLD AUTO: 0.05 10*3/MM3 (ref 0–0.2)
BASOPHILS NFR BLD AUTO: 0.6 % (ref 0–1.5)
BILIRUB UR QL STRIP: ABNORMAL
CLARITY UR: ABNORMAL
COLOR UR: ABNORMAL
DEPRECATED RDW RBC AUTO: 47.8 FL (ref 37–54)
EOSINOPHIL # BLD AUTO: 0.18 10*3/MM3 (ref 0–0.4)
EOSINOPHIL NFR BLD AUTO: 2 % (ref 0.3–6.2)
ERYTHROCYTE [DISTWIDTH] IN BLOOD BY AUTOMATED COUNT: 13.7 % (ref 12.3–15.4)
GLUCOSE UR STRIP-MCNC: NEGATIVE MG/DL
HCG INTACT+B SERPL-ACNC: <0.5 MIU/ML
HCT VFR BLD AUTO: 41.6 % (ref 34–46.6)
HEMOCCULT STL QL IA: NEGATIVE
HGB BLD-MCNC: 14 G/DL (ref 12–15.9)
HGB UR QL STRIP.AUTO: NEGATIVE
HOLD SPECIMEN: NORMAL
HOLD SPECIMEN: NORMAL
HYALINE CASTS UR QL AUTO: ABNORMAL /LPF
IMM GRANULOCYTES # BLD AUTO: 0.02 10*3/MM3 (ref 0–0.05)
IMM GRANULOCYTES NFR BLD AUTO: 0.2 % (ref 0–0.5)
KETONES UR QL STRIP: ABNORMAL
LEUKOCYTE ESTERASE UR QL STRIP.AUTO: ABNORMAL
LYMPHOCYTES # BLD AUTO: 2.34 10*3/MM3 (ref 0.7–3.1)
LYMPHOCYTES NFR BLD AUTO: 26 % (ref 19.6–45.3)
MCH RBC QN AUTO: 31.8 PG (ref 26.6–33)
MCHC RBC AUTO-ENTMCNC: 33.7 G/DL (ref 31.5–35.7)
MCV RBC AUTO: 94.5 FL (ref 79–97)
MONOCYTES # BLD AUTO: 0.58 10*3/MM3 (ref 0.1–0.9)
MONOCYTES NFR BLD AUTO: 6.4 % (ref 5–12)
NEUTROPHILS NFR BLD AUTO: 5.83 10*3/MM3 (ref 1.7–7)
NEUTROPHILS NFR BLD AUTO: 64.8 % (ref 42.7–76)
NITRITE UR QL STRIP: NEGATIVE
NRBC BLD AUTO-RTO: 0 /100 WBC (ref 0–0.2)
PH UR STRIP.AUTO: 6 [PH] (ref 5–8)
PLATELET # BLD AUTO: 303 10*3/MM3 (ref 140–450)
PMV BLD AUTO: 11 FL (ref 6–12)
PROT UR QL STRIP: ABNORMAL
RBC # BLD AUTO: 4.4 10*6/MM3 (ref 3.77–5.28)
RBC # UR STRIP: ABNORMAL /HPF
REF LAB TEST METHOD: ABNORMAL
SP GR UR STRIP: >=1.03 (ref 1–1.03)
SQUAMOUS #/AREA URNS HPF: ABNORMAL /HPF
UROBILINOGEN UR QL STRIP: ABNORMAL
WBC # UR STRIP: ABNORMAL /HPF
WBC NRBC COR # BLD AUTO: 9 10*3/MM3 (ref 3.4–10.8)
WHOLE BLOOD HOLD COAG: NORMAL
WHOLE BLOOD HOLD SPECIMEN: NORMAL

## 2024-07-19 PROCEDURE — 85025 COMPLETE CBC W/AUTO DIFF WBC: CPT | Performed by: EMERGENCY MEDICINE

## 2024-07-19 PROCEDURE — 87086 URINE CULTURE/COLONY COUNT: CPT | Performed by: EMERGENCY MEDICINE

## 2024-07-19 PROCEDURE — 83605 ASSAY OF LACTIC ACID: CPT | Performed by: EMERGENCY MEDICINE

## 2024-07-19 PROCEDURE — 99283 EMERGENCY DEPT VISIT LOW MDM: CPT

## 2024-07-19 PROCEDURE — 84702 CHORIONIC GONADOTROPIN TEST: CPT | Performed by: EMERGENCY MEDICINE

## 2024-07-19 PROCEDURE — 80053 COMPREHEN METABOLIC PANEL: CPT | Performed by: EMERGENCY MEDICINE

## 2024-07-19 PROCEDURE — 82274 ASSAY TEST FOR BLOOD FECAL: CPT | Performed by: EMERGENCY MEDICINE

## 2024-07-19 PROCEDURE — 81001 URINALYSIS AUTO W/SCOPE: CPT | Performed by: EMERGENCY MEDICINE

## 2024-07-19 RX ORDER — SODIUM CHLORIDE 0.9 % (FLUSH) 0.9 %
10 SYRINGE (ML) INJECTION AS NEEDED
Status: DISCONTINUED | OUTPATIENT
Start: 2024-07-19 | End: 2024-07-19 | Stop reason: HOSPADM

## 2024-07-19 RX ORDER — OMEPRAZOLE 40 MG/1
40 CAPSULE, DELAYED RELEASE ORAL DAILY
Qty: 14 CAPSULE | Refills: 0 | Status: SHIPPED | OUTPATIENT
Start: 2024-07-19

## 2024-07-19 RX ORDER — DICYCLOMINE HCL 20 MG
20 TABLET ORAL EVERY 8 HOURS PRN
Qty: 12 TABLET | Refills: 0 | Status: SHIPPED | OUTPATIENT
Start: 2024-07-19

## 2024-07-19 RX ORDER — ONDANSETRON 4 MG/1
4 TABLET, ORALLY DISINTEGRATING ORAL EVERY 6 HOURS PRN
Qty: 15 TABLET | Refills: 0 | Status: SHIPPED | OUTPATIENT
Start: 2024-07-19

## 2024-07-20 LAB
ALBUMIN SERPL-MCNC: 4.2 G/DL (ref 3.5–5.2)
ALBUMIN/GLOB SERPL: 1.6 G/DL
ALP SERPL-CCNC: 82 U/L (ref 39–117)
ALT SERPL W P-5'-P-CCNC: 12 U/L (ref 1–33)
ANION GAP SERPL CALCULATED.3IONS-SCNC: 9.6 MMOL/L (ref 5–15)
AST SERPL-CCNC: 14 U/L (ref 1–32)
BASOPHILS # BLD AUTO: 0.03 10*3/MM3 (ref 0–0.2)
BASOPHILS NFR BLD AUTO: 0.3 % (ref 0–1.5)
BILIRUB SERPL-MCNC: 0.5 MG/DL (ref 0–1.2)
BUN SERPL-MCNC: 11 MG/DL (ref 6–20)
BUN/CREAT SERPL: 18 (ref 7–25)
CALCIUM SPEC-SCNC: 8.9 MG/DL (ref 8.6–10.5)
CHLORIDE SERPL-SCNC: 106 MMOL/L (ref 98–107)
CO2 SERPL-SCNC: 23.4 MMOL/L (ref 22–29)
CREAT SERPL-MCNC: 0.61 MG/DL (ref 0.57–1)
D-LACTATE SERPL-SCNC: 0.7 MMOL/L (ref 0.5–2)
DEPRECATED RDW RBC AUTO: 47.4 FL (ref 37–54)
EGFRCR SERPLBLD CKD-EPI 2021: 119.7 ML/MIN/1.73
EOSINOPHIL # BLD AUTO: 0.06 10*3/MM3 (ref 0–0.4)
EOSINOPHIL NFR BLD AUTO: 0.6 % (ref 0.3–6.2)
ERYTHROCYTE [DISTWIDTH] IN BLOOD BY AUTOMATED COUNT: 13.5 % (ref 12.3–15.4)
GLOBULIN UR ELPH-MCNC: 2.6 GM/DL
GLUCOSE SERPL-MCNC: 90 MG/DL (ref 65–99)
HCT VFR BLD AUTO: 41.1 % (ref 34–46.6)
HGB BLD-MCNC: 13.7 G/DL (ref 12–15.9)
IMM GRANULOCYTES # BLD AUTO: 0.02 10*3/MM3 (ref 0–0.05)
IMM GRANULOCYTES NFR BLD AUTO: 0.2 % (ref 0–0.5)
LYMPHOCYTES # BLD AUTO: 1.79 10*3/MM3 (ref 0.7–3.1)
LYMPHOCYTES NFR BLD AUTO: 18.7 % (ref 19.6–45.3)
MCH RBC QN AUTO: 31.5 PG (ref 26.6–33)
MCHC RBC AUTO-ENTMCNC: 33.3 G/DL (ref 31.5–35.7)
MCV RBC AUTO: 94.5 FL (ref 79–97)
MONOCYTES # BLD AUTO: 0.41 10*3/MM3 (ref 0.1–0.9)
MONOCYTES NFR BLD AUTO: 4.3 % (ref 5–12)
NEUTROPHILS NFR BLD AUTO: 7.28 10*3/MM3 (ref 1.7–7)
NEUTROPHILS NFR BLD AUTO: 75.9 % (ref 42.7–76)
NRBC BLD AUTO-RTO: 0 /100 WBC (ref 0–0.2)
PLATELET # BLD AUTO: 259 10*3/MM3 (ref 140–450)
PMV BLD AUTO: 12.3 FL (ref 6–12)
POTASSIUM SERPL-SCNC: 3.2 MMOL/L (ref 3.5–5.2)
PROT SERPL-MCNC: 6.8 G/DL (ref 6–8.5)
RBC # BLD AUTO: 4.35 10*6/MM3 (ref 3.77–5.28)
SODIUM SERPL-SCNC: 139 MMOL/L (ref 136–145)
WBC NRBC COR # BLD AUTO: 9.59 10*3/MM3 (ref 3.4–10.8)

## 2024-07-20 NOTE — ED PROVIDER NOTES
Time: 8:20 PM EDT  Date of encounter:  7/19/2024  Independent Historian/Clinical History and Information was obtained by:   Patient    History is limited by: N/A    Chief Complaint: Abdominal pain, nausea and diarrhea      History of Present Illness:  Patient is a 34 y.o. year old female who presents to the emergency department for evaluation of abdominal pain, diarrhea.  All symptoms started at 05 100 this morning.  Patient denies vomiting.  She has chronic abdominal issues with recurrent diarrhea and constipation and wonders if she has IBS.  She states she drinks almost no water throughout the day and every time she brings this up to her primary care provider they put her off and tell her to drink water.  This episode concerned her more because she has not had bleeding in the past.  She had 2 episodes of bright red blood per rectum today.  No melena.  No vomiting blood.  Afebrile.  Her pain is currently improved, located in the midline abdomen above the umbilicus.    HPI    Patient Care Team  Primary Care Provider: [place] Luke MD    Past Medical History:     Allergies   Allergen Reactions    Augmentin [Amoxicillin-Pot Clavulanate] GI Intolerance     Past Medical History:   Diagnosis Date    Allergic     PONV (postoperative nausea and vomiting)      Past Surgical History:   Procedure Laterality Date    CERVICAL CONIZATION      DILATATION AND CURETTAGE N/A 6/21/2022    Procedure: DILATATION AND CURETTAGE suction;  Surgeon: Tammy Eng MD;  Location: [facility];  Service: Gynecology;  Laterality: N/A;    KNEE ARTHROSCOPY Right     x 2    LEEP       Family History   Problem Relation Age of Onset    Diabetes Mother     Malig Hyperthermia Neg Hx        Home Medications:  Prior to Admission medications    Medication Sig Start Date End Date Taking? Authorizing Provider   mupirocin (BACTROBAN) 2 % ointment Apply 1 application topically to the appropriate area as directed 3 (Three) Times a Day.  "8/7/22   Megan Reyes APRN        Social History:   Social History     Tobacco Use    Smoking status: Former    Smokeless tobacco: Never   Vaping Use    Vaping status: Former   Substance Use Topics    Alcohol use: Not Currently    Drug use: Not Currently     Types: Marijuana     Comment: occ         Review of Systems:  Review of Systems   Constitutional:  Negative for chills and fever.   HENT:  Negative for congestion, rhinorrhea and sore throat.    Eyes:  Negative for photophobia.   Respiratory:  Negative for apnea, cough, chest tightness and shortness of breath.    Cardiovascular:  Negative for chest pain and palpitations.   Gastrointestinal:  Positive for abdominal pain, blood in stool and diarrhea. Negative for nausea and vomiting.   Endocrine: Negative.    Genitourinary:  Negative for difficulty urinating and dysuria.   Musculoskeletal:  Negative for back pain, joint swelling and myalgias.   Skin:  Negative for color change and wound.   Allergic/Immunologic: Negative.    Neurological:  Negative for seizures and headaches.   Psychiatric/Behavioral: Negative.     All other systems reviewed and are negative.       Physical Exam:  /74 (BP Location: Right arm, Patient Position: Lying)   Pulse 56   Temp 98 °F (36.7 °C) (Oral)   Resp 18   Ht 160 cm (63\")   Wt 51.3 kg (113 lb 1.5 oz)   SpO2 98%   BMI 20.03 kg/m²     Physical Exam  Vitals and nursing note reviewed.   Constitutional:       General: She is awake.      Appearance: Normal appearance. She is well-developed.   HENT:      Head: Normocephalic and atraumatic.      Nose: Nose normal.      Mouth/Throat:      Mouth: Mucous membranes are moist.   Eyes:      Extraocular Movements: Extraocular movements intact.      Pupils: Pupils are equal, round, and reactive to light.   Cardiovascular:      Rate and Rhythm: Normal rate and regular rhythm.      Heart sounds: Normal heart sounds.   Pulmonary:      Effort: Pulmonary effort is normal. No respiratory " distress.      Breath sounds: Normal breath sounds. No wheezing, rhonchi or rales.   Abdominal:      General: Bowel sounds are normal.      Palpations: Abdomen is soft.      Tenderness: There is abdominal tenderness in the epigastric area. There is no guarding or rebound.      Comments: No rigidity   Musculoskeletal:         General: No tenderness. Normal range of motion.      Cervical back: Normal range of motion and neck supple.   Skin:     General: Skin is warm and dry.      Coloration: Skin is not jaundiced.   Neurological:      General: No focal deficit present.      Mental Status: She is alert. Mental status is at baseline.   Psychiatric:         Mood and Affect: Mood normal.                  Procedures:  Procedures      Medical Decision Making:      Comorbidities that affect care:    Seasonal allergies, postoperative nausea and vomiting    External Notes reviewed:    Previous Clinic Note: [facility] OB/GYN admission 9/17/2023.  Vaginal delivery      The following orders were placed and all results were independently analyzed by me:  Orders Placed This Encounter   Procedures    [place] Draw    Comprehensive Metabolic Panel    Lipase    Urinalysis With Microscopic If Indicated (No Culture) - Urine, Clean Catch    hCG, Quantitative, Pregnancy    CBC Auto Differential    Occult Blood, Fecal By Immunoassay - Stool, Per Rectum    Urinalysis, Microscopic Only - Urine, Clean Catch    NPO Diet NPO Type: Strict NPO    Undress & Gown    Insert Peripheral IV    CBC & Differential    Green Top (Gel)    Lavender Top    Gold Top - SST    Light Blue Top       Medications Given in the Emergency Department:  Medications   sodium chloride 0.9 % flush 10 mL (has no administration in time range)        ED Course:    ED Course as of 07/19/24 2050 Fri Jul 19, 2024 2045 Offered CT scan, however the patient declines as she does not want to have another 1 to 2-hour wait here in the emergency department.  Her pain is  improved here.  It is vague and mild mainly above the umbilicus in the midline.  She has no focal tenderness concerning for diverticulitis, appendicitis or any other emergent abdominal pathology. [RP]      ED Course User Index  [RP] Guy Salguero MD       Labs:    Lab Results (last 24 hours)       Procedure Component Value Units Date/Time    Urinalysis With Microscopic If Indicated (No Culture) - Urine, Clean Catch [[id number]]  (Abnormal) Collected: 07/19/24 2000    Specimen: Urine, Clean Catch Updated: 07/19/24 2025     Color, UA Dark Yellow     Appearance, UA Cloudy     pH, UA 6.0     Specific Gravity, UA >=1.030     Glucose, UA Negative     Ketones, UA 15 mg/dL (1+)     Bilirubin, UA Moderate (2+)     Blood, UA Negative     Protein, UA 30 mg/dL (1+)     Leuk Esterase, UA Trace     Nitrite, UA Negative     Urobilinogen, UA 1.0 E.U./dL    Urinalysis, Microscopic Only - Urine, Clean Catch [[id number]]  (Abnormal) Collected: 07/19/24 2000    Specimen: Urine, Clean Catch Updated: 07/19/24 2025     RBC, UA 6-10 /HPF      WBC, UA 3-5 /HPF      Bacteria, UA 2+ /HPF      Squamous Epithelial Cells, UA 13-20 /HPF      Hyaline Casts, UA 7-12 /LPF      Methodology Automated Microscopy    Occult Blood, Fecal By Immunoassay - Stool, Per Rectum [[id number]]  (Normal) Collected: 07/19/24 2005    Specimen: Stool from Per Rectum Updated: 07/19/24 2034     Occult Blood, Fecal by Immunoassay Negative    CBC & Differential [[id number]]  (Normal) Collected: 07/19/24 2013    Specimen: Blood Updated: 07/19/24 2023    Narrative:      The following orders were created for panel order CBC & Differential.  Procedure                               Abnormality         Status                     ---------                               -----------         ------                     CBC Auto Differential[[id number]]        Normal              Final result                 Please view results for these tests on the individual orders.     Comprehensive Metabolic Panel [[id number]] Collected: 07/19/24 2013    Specimen: Blood Updated: 07/19/24 2019    Lipase [[id number]] Collected: 07/19/24 2013    Specimen: Blood Updated: 07/19/24 2019    hCG, Quantitative, Pregnancy [[id number]] Collected: 07/19/24 2013    Specimen: Blood Updated: 07/19/24 2019    CBC Auto Differential [[id number]]  (Normal) Collected: 07/19/24 2013    Specimen: Blood Updated: 07/19/24 2023     WBC 9.00 10*3/mm3      RBC 4.40 10*6/mm3      Hemoglobin 14.0 g/dL      Hematocrit 41.6 %      MCV 94.5 fL      MCH 31.8 pg      MCHC 33.7 g/dL      RDW 13.7 %      RDW-SD 47.8 fl      MPV 11.0 fL      Platelets 303 10*3/mm3      Neutrophil % 64.8 %      Lymphocyte % 26.0 %      Monocyte % 6.4 %      Eosinophil % 2.0 %      Basophil % 0.6 %      Immature Grans % 0.2 %      Neutrophils, Absolute 5.83 10*3/mm3      Lymphocytes, Absolute 2.34 10*3/mm3      Monocytes, Absolute 0.58 10*3/mm3      Eosinophils, Absolute 0.18 10*3/mm3      Basophils, Absolute 0.05 10*3/mm3      Immature Grans, Absolute 0.02 10*3/mm3      nRBC 0.0 /100 WBC              Imaging:    No Radiology Exams Resulted Within Past 24 Hours      Differential Diagnosis and Discussion:    Abdominal Pain: Based on the patient's signs and symptoms, I considered abdominal aortic aneurysm, small bowel obstruction, pancreatitis, acute cholecystitis, acute appendecitis, peptic ulcer disease, gastritis, colitis, endocrine disorders, irritable bowel syndrome and other differential diagnosis an etiology of the patient's abdominal pain.  GI Bleeding: Differential diagnosis includes but is not limited to gastritis, gastric ulcer, stress ulcer, duodenitis, Alexandra-Aguayo tears, esophageal varices, angiodysplasia, aortic enteric fistula, hematologic issues including thrombocytopenia, GI neoplasm, ulcerative colitis, Crohn's disease, diverticulosis, diverticulitis, hemorrhoids, aortic aneurysm, and polyps    All labs were reviewed and interpreted by  me.    [facility]               Patient Care Considerations:    CT ABDOMEN AND PELVIS: I considered ordering a CT scan of the abdomen and pelvis however patient declined CT scan.  I am comfortable with this plan as she is hemodynamically stable with a normal white blood cell count.  Her history/physical exam not concerning for emergent abdominal pathology.      Consultants/Shared Management Plan:    None    Social Determinants of Health:    Patient is independent, reliable, and has access to care.       Disposition and Care Coordination:    Discharged: The patient is suitable and stable for discharge with no need for consideration of admission.    I have explained the patient´s condition, diagnoses and treatment plan based on the information available to me at this time. I have answered questions and addressed any concerns. The patient has a good  understanding of the patient´s diagnosis, condition, and treatment plan as can be expected at this point. The vital signs have been stable. The patient´s condition is stable and appropriate for discharge from the emergency department.      The patient will pursue further outpatient evaluation with the primary care physician or other designated or consulting physician as outlined in the discharge instructions. They are agreeable to this plan of care and follow-up instructions have been explained in detail. The patient has received these instructions in written format and has expressed an understanding of the discharge instructions. The patient is aware that any significant change in condition or worsening of symptoms should prompt an immediate return to this or the closest emergency department or call to 911.    Final diagnoses:   Rectal bleeding        ED Disposition       ED Disposition   Discharge    Condition   Stable    Comment   --               This medical record created using voice recognition software.             Guy Salguero MD  07/19/24 [id number]

## 2024-07-20 NOTE — DISCHARGE INSTRUCTIONS
Return emergency department immediately for fever, uncontrolled abdominal pain, vomiting blood, black tar-like stools.  Stay well-hydrated.

## 2024-07-21 LAB — BACTERIA SPEC AEROBE CULT: NORMAL

## 2024-07-28 ENCOUNTER — TELEMEDICINE (OUTPATIENT)
Dept: FAMILY MEDICINE CLINIC | Facility: TELEHEALTH | Age: 34
End: 2024-07-28

## 2024-07-28 DIAGNOSIS — I88.9 LYMPHADENITIS: ICD-10-CM

## 2024-07-28 DIAGNOSIS — J03.90 TONSILLITIS: Primary | ICD-10-CM

## 2024-07-28 PROBLEM — F17.200 NICOTINE DEPENDENCE: Status: ACTIVE | Noted: 2023-03-22

## 2024-07-28 PROBLEM — F32.A DEPRESSION: Status: ACTIVE | Noted: 2023-03-22

## 2024-07-28 PROBLEM — O34.42 HISTORY OF LOOP ELECTROSURGICAL EXCISION PROCEDURE (LEEP) OF CERVIX AFFECTING PREGNANCY IN SECOND TRIMESTER: Status: ACTIVE | Noted: 2023-04-19

## 2024-07-28 PROBLEM — R12 HEARTBURN: Status: ACTIVE | Noted: 2023-03-22

## 2024-07-28 PROBLEM — Z98.890 HISTORY OF LOOP ELECTROSURGICAL EXCISION PROCEDURE (LEEP) OF CERVIX AFFECTING PREGNANCY IN SECOND TRIMESTER: Status: ACTIVE | Noted: 2023-04-19

## 2024-07-28 PROCEDURE — 99213 OFFICE O/P EST LOW 20 MIN: CPT | Performed by: NURSE PRACTITIONER

## 2024-07-28 RX ORDER — CLONAZEPAM 1 MG/1
1 TABLET ORAL 2 TIMES DAILY PRN
COMMUNITY

## 2024-07-28 RX ORDER — AMOXICILLIN 500 MG/1
500 CAPSULE ORAL 2 TIMES DAILY
Qty: 20 CAPSULE | Refills: 0 | Status: SHIPPED | OUTPATIENT
Start: 2024-07-28 | End: 2024-08-07

## 2024-07-28 RX ORDER — IBUPROFEN 600 MG/1
TABLET ORAL
COMMUNITY
Start: 2024-07-09

## 2024-07-28 NOTE — PROGRESS NOTES
You have chosen to receive care through a telehealth visit.  Do you consent to use a video/audio connection for your medical care today? Yes     CHIEF COMPLAINT  Chief Complaint   Patient presents with    Sore Throat         HPI  Tanika Ash is a 34 y.o. female  presents with complaint of throat pain, ear pain on the right side. Reports the symptoms started 3 days ago. No fever or chills. Reports 2 weeks ago it was sore throat on the left side and now it has returned on the right side 3 days ago. + lymph node on the right swollen. Reports she is having itching and some pain in the right ear. Reports she has some white stuff on her throat. Reports she has tried Mucinex Max that hasn't helped. Reports she had a tooth removed on the left side 2 weeks ago but there is no swelling or pain in the area. Patient reports she is not having any issues with her thyroid being swollen she meant lymph nodes.      Review of Systems   Constitutional:  Negative for chills, fatigue and fever.   HENT:  Positive for ear pain and sore throat. Negative for congestion, ear discharge, sinus pressure and sinus pain.         Hurts to swallow   Respiratory:  Negative for cough, chest tightness, shortness of breath and wheezing.    Cardiovascular:  Negative for chest pain.   Gastrointestinal:  Negative for abdominal pain, diarrhea, nausea and vomiting.   Musculoskeletal:  Negative for back pain and myalgias.   Neurological:  Negative for dizziness and headaches.   Psychiatric/Behavioral: Negative.         Past Medical History:   Diagnosis Date    Allergic     PONV (postoperative nausea and vomiting)        Family History   Problem Relation Age of Onset    Diabetes Mother     Malig Hyperthermia Neg Hx        Social History     Socioeconomic History    Marital status: Single   Tobacco Use    Smoking status: Every Day     Current packs/day: 0.25     Types: Cigarettes    Smokeless tobacco: Never   Vaping Use    Vaping status: Former    Substance and Sexual Activity    Alcohol use: Not Currently    Drug use: Not Currently     Types: Marijuana     Comment: michael Ash  reports that she has been smoking cigarettes. She has never used smokeless tobacco. I have educated her on the risk of diseases from using tobacco products such as cancer, COPD, and heart disease.     I advised her to quit and she is not willing to quit.    I spent  1  minutes counseling the patient.              LMP 07/24/2024   Breastfeeding No     PHYSICAL EXAM  Physical Exam   Constitutional: She is oriented to person, place, and time. She appears well-developed and well-nourished. No distress.   HENT:   Head: Normocephalic and atraumatic.   Right Ear: Hearing normal. No drainage.   Left Ear: Hearing normal. No drainage.   Nose: No congestion. Right sinus exhibits no maxillary sinus tenderness. Left sinus exhibits no maxillary sinus tenderness.   Mouth/Throat: Mouth/Lips are normal.Oropharyngeal exudate present.   Patient directed exam  Throat is red and swollen    Eyes: Conjunctivae and lids are normal.   Pulmonary/Chest: Effort normal.  No respiratory distress.  Lymphadenopathy:        Right cervical: Anterior cervical adenopathy present.        Left cervical: Anterior cervical adenopathy present.   Patient directed exam  Right greater than left   Neurological: She is alert and oriented to person, place, and time.   Psychiatric: She has a normal mood and affect. Her speech is normal and behavior is normal.       Results for orders placed or performed during the hospital encounter of 07/19/24   Urine Culture - Urine, Urine, Clean Catch    Specimen: Urine, Clean Catch   Result Value Ref Range    Urine Culture 50,000 CFU/mL Normal Urogenital Sangeetha    Urinalysis With Microscopic If Indicated (No Culture) - Urine, Clean Catch    Specimen: Urine, Clean Catch   Result Value Ref Range    Color, UA Dark Yellow (A) Yellow, Straw    Appearance, UA Cloudy (A) Clear     pH, UA 6.0 5.0 - 8.0    Specific Gravity, UA >=1.030 1.005 - 1.030    Glucose, UA Negative Negative    Ketones, UA 15 mg/dL (1+) (A) Negative    Bilirubin, UA Moderate (2+) (A) Negative    Blood, UA Negative Negative    Protein, UA 30 mg/dL (1+) (A) Negative    Leuk Esterase, UA Trace (A) Negative    Nitrite, UA Negative Negative    Urobilinogen, UA 1.0 E.U./dL 0.2 - 1.0 E.U./dL   hCG, Quantitative, Pregnancy    Specimen: Blood   Result Value Ref Range    HCG Quantitative <0.50 mIU/mL   CBC Auto Differential    Specimen: Blood   Result Value Ref Range    WBC 9.00 3.40 - 10.80 10*3/mm3    RBC 4.40 3.77 - 5.28 10*6/mm3    Hemoglobin 14.0 12.0 - 15.9 g/dL    Hematocrit 41.6 34.0 - 46.6 %    MCV 94.5 79.0 - 97.0 fL    MCH 31.8 26.6 - 33.0 pg    MCHC 33.7 31.5 - 35.7 g/dL    RDW 13.7 12.3 - 15.4 %    RDW-SD 47.8 37.0 - 54.0 fl    MPV 11.0 6.0 - 12.0 fL    Platelets 303 140 - 450 10*3/mm3    Neutrophil % 64.8 42.7 - 76.0 %    Lymphocyte % 26.0 19.6 - 45.3 %    Monocyte % 6.4 5.0 - 12.0 %    Eosinophil % 2.0 0.3 - 6.2 %    Basophil % 0.6 0.0 - 1.5 %    Immature Grans % 0.2 0.0 - 0.5 %    Neutrophils, Absolute 5.83 1.70 - 7.00 10*3/mm3    Lymphocytes, Absolute 2.34 0.70 - 3.10 10*3/mm3    Monocytes, Absolute 0.58 0.10 - 0.90 10*3/mm3    Eosinophils, Absolute 0.18 0.00 - 0.40 10*3/mm3    Basophils, Absolute 0.05 0.00 - 0.20 10*3/mm3    Immature Grans, Absolute 0.02 0.00 - 0.05 10*3/mm3    nRBC 0.0 0.0 - 0.2 /100 WBC   Occult Blood, Fecal By Immunoassay - Stool, Per Rectum    Specimen: Per Rectum; Stool   Result Value Ref Range    Occult Blood, Fecal by Immunoassay Negative Negative   Urinalysis, Microscopic Only - Urine, Clean Catch    Specimen: Urine, Clean Catch   Result Value Ref Range    RBC, UA 6-10 (A) None Seen, 0-2 /HPF    WBC, UA 3-5 (A) None Seen, 0-2 /HPF    Bacteria, UA 2+ (A) None Seen /HPF    Squamous Epithelial Cells, UA 13-20 (A) None Seen, 0-2 /HPF    Hyaline Casts, UA 7-12 None Seen /LPF    Methodology  Automated Microscopy    Comprehensive Metabolic Panel    Specimen: Blood   Result Value Ref Range    Glucose 90 65 - 99 mg/dL    BUN 11 6 - 20 mg/dL    Creatinine 0.61 0.57 - 1.00 mg/dL    Sodium 139 136 - 145 mmol/L    Potassium 3.2 (L) 3.5 - 5.2 mmol/L    Chloride 106 98 - 107 mmol/L    CO2 23.4 22.0 - 29.0 mmol/L    Calcium 8.9 8.6 - 10.5 mg/dL    Total Protein 6.8 6.0 - 8.5 g/dL    Albumin 4.2 3.5 - 5.2 g/dL    ALT (SGPT) 12 1 - 33 U/L    AST (SGOT) 14 1 - 32 U/L    Alkaline Phosphatase 82 39 - 117 U/L    Total Bilirubin 0.5 0.0 - 1.2 mg/dL    Globulin 2.6 gm/dL    A/G Ratio 1.6 g/dL    BUN/Creatinine Ratio 18.0 7.0 - 25.0    Anion Gap 9.6 5.0 - 15.0 mmol/L    eGFR 119.7 >60.0 mL/min/1.73   Lactic Acid, Plasma    Specimen: Blood   Result Value Ref Range    Lactate 0.7 0.5 - 2.0 mmol/L   CBC Auto Differential    Specimen: Blood   Result Value Ref Range    WBC 9.59 3.40 - 10.80 10*3/mm3    RBC 4.35 3.77 - 5.28 10*6/mm3    Hemoglobin 13.7 12.0 - 15.9 g/dL    Hematocrit 41.1 34.0 - 46.6 %    MCV 94.5 79.0 - 97.0 fL    MCH 31.5 26.6 - 33.0 pg    MCHC 33.3 31.5 - 35.7 g/dL    RDW 13.5 12.3 - 15.4 %    RDW-SD 47.4 37.0 - 54.0 fl    MPV 12.3 (H) 6.0 - 12.0 fL    Platelets 259 140 - 450 10*3/mm3    Neutrophil % 75.9 42.7 - 76.0 %    Lymphocyte % 18.7 (L) 19.6 - 45.3 %    Monocyte % 4.3 (L) 5.0 - 12.0 %    Eosinophil % 0.6 0.3 - 6.2 %    Basophil % 0.3 0.0 - 1.5 %    Immature Grans % 0.2 0.0 - 0.5 %    Neutrophils, Absolute 7.28 (H) 1.70 - 7.00 10*3/mm3    Lymphocytes, Absolute 1.79 0.70 - 3.10 10*3/mm3    Monocytes, Absolute 0.41 0.10 - 0.90 10*3/mm3    Eosinophils, Absolute 0.06 0.00 - 0.40 10*3/mm3    Basophils, Absolute 0.03 0.00 - 0.20 10*3/mm3    Immature Grans, Absolute 0.02 0.00 - 0.05 10*3/mm3    nRBC 0.0 0.0 - 0.2 /100 WBC   Green Top (Gel)   Result Value Ref Range    Extra Tube Hold for add-ons.    Lavender Top   Result Value Ref Range    Extra Tube hold for add-on    Gold Top - SST   Result Value Ref Range     Extra Tube Hold for add-ons.    Light Blue Top   Result Value Ref Range    Extra Tube Hold for add-ons.        Diagnoses and all orders for this visit:    1. Tonsillitis (Primary)    2. Lymphadenitis    Other orders  -     amoxicillin (AMOXIL) 500 MG capsule; Take 1 capsule by mouth 2 (Two) Times a Day for 10 days.  Dispense: 20 capsule; Refill: 0    Rest  Fluids  COVID test   PCP if symptoms persist   ER for any worsening symptoms such as high fever, drooling, SOA or trouble breathing       FOLLOW-UP  As discussed during visit with PCP/[facility] if no improvement or Urgent Care/Emergency Department if worsening of symptoms    Patient verbalizes understanding of medication dosage, comfort measures, instructions for treatment and follow-up.    Julieta Araya, APRN  07/28/2024  19:37 EDT    The use of a video visit has been reviewed with the patient and verbal informed consent has been obtained. Myself and Tanika Ash participated in this visit. The patient is located in [address] [place] [address].    I am located in [place], [place]. Mychart and Twilio were utilized. I spent 5 minutes in the patient's chart for this visit.      Note Disclaimer: At [facility], we believe that sharing information builds trust and better   relationships. You are receiving this note because you recently visited [facility]. It is possible you   will see health information before a provider has talked with you about it. This kind of information can   be easy to misunderstand. To help you fully understand what it means for your health, we urge you to   discuss this note with your provider.

## 2024-07-31 ENCOUNTER — CLINICAL SUPPORT (OUTPATIENT)
Dept: GASTROENTEROLOGY | Facility: CLINIC | Age: 34
End: 2024-07-31
Payer: COMMERCIAL

## 2024-07-31 ENCOUNTER — PREP FOR SURGERY (OUTPATIENT)
Dept: OTHER | Facility: HOSPITAL | Age: 34
End: 2024-07-31
Payer: COMMERCIAL

## 2024-07-31 DIAGNOSIS — K62.5 RECTAL BLEEDING: Primary | ICD-10-CM

## 2024-07-31 RX ORDER — POLYETHYLENE GLYCOL 3350, SODIUM CHLORIDE, SODIUM BICARBONATE, POTASSIUM CHLORIDE 420; 11.2; 5.72; 1.48 G/4L; G/4L; G/4L; G/4L
4000 POWDER, FOR SOLUTION ORAL ONCE
Qty: 4000 ML | Refills: 0 | Status: SHIPPED | OUTPATIENT
Start: 2024-07-31 | End: 2024-07-31

## 2024-07-31 RX ORDER — SERTRALINE HYDROCHLORIDE 25 MG/1
25 TABLET, FILM COATED ORAL DAILY
COMMUNITY

## 2024-07-31 NOTE — PROGRESS NOTES
Tanika Ash  1990  34 y.o.    Reason for call: Rectal Bleeding  Prep prescribed: Nulytley  Prep instructions reviewed with patient and sent to patient via Boxt  Is the patient currently on any injectable or oral medications for weight loss or diabetes? No  Clearance needed? No  If yes, what clearance is needed? N/A  Clearance has been requested from N/A  The patient has been scheduled for: Colonoscopy  After your procedure, you will be contacted with results. Please confirm the best phone # to reach the patient: [phone]  Family history of colon cancer? No  If yes, indicate relative: N/A  Tentative Procedure Date: 08/19/2024  Family History   Problem Relation Age of Onset    Diabetes Mother     Lung cancer Mother     Other (Brain tumor) Mother     Malig Hyperthermia Neg Hx      Past Medical History:   Diagnosis Date    Allergic     PONV (postoperative nausea and vomiting)      Allergies   Allergen Reactions    Amoxicillin-Pot Clavulanate GI Intolerance and Nausea And Vomiting     Past Surgical History:   Procedure Laterality Date    CERVICAL CONIZATION      DILATATION AND CURETTAGE N/A 6/21/2022    Procedure: DILATATION AND CURETTAGE suction;  Surgeon: Tammy Eng MD;  Location: [facility] MAIN OR;  Service: Gynecology;  Laterality: N/A;    KNEE ARTHROSCOPY Right     x 2    LEEP       Social History     Socioeconomic History    Marital status: Single   Tobacco Use    Smoking status: Every Day     Current packs/day: 0.25     Types: Cigarettes    Smokeless tobacco: Never   Vaping Use    Vaping status: Former   Substance and Sexual Activity    Alcohol use: Not Currently     Comment: rarely    Drug use: Not Currently     Types: Marijuana     Comment: occ    Sexual activity: Defer       Current Outpatient Medications:     buPROPion HCl (WELLBUTRIN PO), Take  by mouth., Disp: , Rfl:     clonazePAM (KlonoPIN) 1 MG tablet, Take 1 tablet by mouth 2 (Two) Times a Day As Needed for Anxiety., Disp: ,  Rfl:     sertraline (ZOLOFT) 25 MG tablet, Take 1 tablet by mouth Daily., Disp: , Rfl:     amoxicillin (AMOXIL) 500 MG capsule, Take 1 capsule by mouth 2 (Two) Times a Day for 10 days. (Patient not taking: Reported on 7/31/2024), Disp: 20 capsule, Rfl: 0    dicyclomine (BENTYL) 20 MG tablet, Take 1 tablet by mouth Every 8 (Eight) Hours As Needed for Abdominal Cramping. (Patient not taking: Reported on 7/31/2024), Disp: 12 tablet, Rfl: 0    ibuprofen (ADVIL,MOTRIN) 600 MG tablet, , Disp: , Rfl:     mupirocin (BACTROBAN) 2 % ointment, Apply 1 application topically to the appropriate area as directed 3 (Three) Times a Day., Disp: 1 each, Rfl: 0    omeprazole (priLOSEC) 40 MG capsule, Take 1 capsule by mouth Daily. (Patient not taking: Reported on 7/31/2024), Disp: 14 capsule, Rfl: 0    ondansetron ODT (ZOFRAN-ODT) 4 MG disintegrating tablet, Place 1 tablet on the tongue Every 6 (Six) Hours As Needed for Vomiting. (Patient not taking: Reported on 7/31/2024), Disp: 15 tablet, Rfl: 0    Pain Reliever Extra Strength 500 MG tablet, , Disp: , Rfl:

## 2024-08-16 ENCOUNTER — ANESTHESIA EVENT (OUTPATIENT)
Dept: GASTROENTEROLOGY | Facility: HOSPITAL | Age: 34
End: 2024-08-16
Payer: COMMERCIAL

## 2024-08-16 NOTE — ANESTHESIA PREPROCEDURE EVALUATION
Anesthesia Evaluation     Patient summary reviewed and Nursing notes reviewed   history of anesthetic complications:  PONV  NPO Solid Status: > 8 hours  NPO Liquid Status: > 4 hours           Airway   Mallampati: II  TM distance: <3 FB  Neck ROM: full  No difficulty expected  Dental - normal exam     Comment: Left upper tooth pulled 2 months ago-no issues      Pulmonary - normal exam   (+) a smoker Former,  Cardiovascular   Exercise tolerance: good (4-7 METS)    Rhythm: regular  Rate: normal        Neuro/Psych  (+) headaches, psychiatric history Depression  GI/Hepatic/Renal/Endo    (+) GI bleeding     Musculoskeletal     (+) back pain, neck pain  Abdominal    Substance History   (+) drug use (regular marijuana use)     OB/GYN          Other        ROS/Med Hx Other: Hx rectal bleeding     No EKG on file                           Anesthesia Plan    ASA 2     general   total IV anesthesia  (Total IV Anesthesia    Patient understands anesthesia not responsible for dental damage.  )  intravenous induction     Anesthetic plan, risks, benefits, and alternatives have been provided, discussed and informed consent has been obtained with: patient.    Plan discussed with CRNA.        CODE STATUS:

## 2024-08-19 ENCOUNTER — ANESTHESIA (OUTPATIENT)
Dept: GASTROENTEROLOGY | Facility: HOSPITAL | Age: 34
End: 2024-08-19
Payer: COMMERCIAL

## 2024-08-19 ENCOUNTER — TELEPHONE (OUTPATIENT)
Dept: GASTROENTEROLOGY | Facility: CLINIC | Age: 34
End: 2024-08-19
Payer: COMMERCIAL

## 2024-08-19 NOTE — TELEPHONE ENCOUNTER
Patient called after hour services to reschedule procedure.  Attempted to contact pt to r/s procedure. Left a vm requesting a return call.   Procedure has been cx.

## 2024-08-20 ENCOUNTER — TELEMEDICINE (OUTPATIENT)
Dept: FAMILY MEDICINE CLINIC | Facility: TELEHEALTH | Age: 34
End: 2024-08-20
Payer: COMMERCIAL

## 2024-08-20 DIAGNOSIS — U07.1 COVID-19: Primary | ICD-10-CM

## 2024-08-20 PROBLEM — R12 HEARTBURN: Status: RESOLVED | Noted: 2023-03-22 | Resolved: 2024-08-20

## 2024-08-20 RX ORDER — BUPROPION HYDROCHLORIDE 150 MG/1
TABLET ORAL
COMMUNITY
Start: 2024-08-14

## 2024-08-20 RX ORDER — QUETIAPINE FUMARATE 25 MG/1
TABLET, FILM COATED ORAL
COMMUNITY
Start: 2024-06-12

## 2024-08-20 RX ORDER — POLYETHYLENE GLYCOL 3350, SODIUM CHLORIDE, SODIUM BICARBONATE, POTASSIUM CHLORIDE 420; 11.2; 5.72; 1.48 G/4L; G/4L; G/4L; G/4L
POWDER, FOR SOLUTION ORAL
COMMUNITY
Start: 2024-08-03

## 2024-08-20 RX ORDER — CLONAZEPAM 0.5 MG/1
TABLET ORAL
COMMUNITY
Start: 2024-05-10

## 2024-08-20 RX ORDER — AMOXICILLIN 500 MG/1
500 CAPSULE ORAL 2 TIMES DAILY
COMMUNITY
Start: 2024-08-14

## 2024-08-20 NOTE — PROGRESS NOTES
CHIEF COMPLAINT  Chief Complaint   Patient presents with    Cough         HPI  Tanika Ash is a 34 y.o. female  presents with complaint of symptoms of COVID-19 that started 3-4 days ago. Started with a sore throat and this has gone away.   Taking over the counter cold and flu medications.   She is wondering if there is anything else she can take.     Review of Systems   Constitutional:  Positive for chills, diaphoresis, fatigue and fever (last time she had a fever was yesterday aftenoon.).   HENT:  Positive for congestion (mild), rhinorrhea (mild) and sneezing (occasional). Negative for sore throat (resolved).    Respiratory:  Positive for cough and shortness of breath (Has shortness of breath normally due to anxiety and feels this is no different). Negative for chest tightness and wheezing.    Cardiovascular:  Negative for chest pain.   Gastrointestinal:  Negative for diarrhea, nausea and vomiting.   Musculoskeletal:  Positive for myalgias.   Neurological:  Positive for headaches.       Past Medical History:   Diagnosis Date    Allergic     PONV (postoperative nausea and vomiting)        Family History   Problem Relation Age of Onset    Diabetes Mother     Lung cancer Mother     Other (Brain tumor) Mother     Malig Hyperthermia Neg Hx        Social History     Socioeconomic History    Marital status: Single   Tobacco Use    Smoking status: Former     Current packs/day: 0.25     Types: Cigarettes    Smokeless tobacco: Never    Tobacco comments:     8/20/24 quit 4-5 years ago   Vaping Use    Vaping status: Former   Substance and Sexual Activity    Alcohol use: Not Currently     Comment: rarely    Drug use: Not Currently     Types: Marijuana     Comment: occ    Sexual activity: Defer         LMP 07/24/2024   Breastfeeding No     PHYSICAL EXAM  Physical Exam   Constitutional: She is oriented to person, place, and time. She appears well-developed and well-nourished. She does not have a sickly appearance. She  does not appear ill. No distress.   HENT:   Head: Normocephalic and atraumatic.   Eyes: EOM are normal.   Neck: Neck normal appearance.  Pulmonary/Chest: Effort normal.  No respiratory distress.  Neurological: She is alert and oriented to person, place, and time.   Skin: Skin is dry.   Psychiatric: She has a normal mood and affect.           Diagnoses and all orders for this visit:    1. COVID-19 (Primary)      This is likely a viral illness. Viral illnesses do not respond to antibiotics. It is best to treat viruses with rest and drinking plenty of fluids. Over the counter medications can help ease symptoms.  I have added information on Paxlovd in the after visit summary.      The use of a video visit has been reviewed with the patient and verbal informed consent has been obtained. Myself and Tanika Ash participated in this visit. The patient is located in [address] [place] [address]. I am located in [place], [place]. Mychart and Twilio were utilized.       Note Disclaimer: At [facility], we believe that sharing information builds trust and better   relationships. You are receiving this note because you recently visited [facility]. It is possible you   will see health information before a provider has talked with you about it. This kind of information can   be easy to misunderstand. To help you fully understand what it means for your health, we urge you to   discuss this note with your provider.    Wendie Keith, STEPHAN  08/20/2024  07:39 EDT

## 2024-08-20 NOTE — PATIENT INSTRUCTIONS
Drink plenty of water  Over the counter pain relievers okay   If symptoms do not improve in 3-5 days follow up with your primary care provider or urgent care  If symptoms worsen follow up with urgent care or the emergency room      Diagnosis  COVID-19 infection  Most people with COVID-19 have mild to moderate symptoms and can rest at home until they get better.   Stay home  While you're recovering, STAY HOME for at least 5 full days. Don't leave your home except to get medical care.  While at home, avoid close contact with others.  If possible, stay in a room away from other people in your home, and use a separate bathroom.  Wear a well-fitting face mask when you can't avoid contact with other people.  If you can't wear a face mask because of breathing difficulty, your caregiver should wear a face mask.  Wearing a face mask does NOT mean you can leave your home. You must continue to stay home for at least 5 full days.  You can return to your normal activities when ALL of the following are true:  You've been fever-free for at least 24 hours (1 full day) without using fever-reducing medications such as Tylenol  Your symptoms have improved  It's been at least 5 full days since your symptoms first started  You should continue to wear a mask around others until it's been at least 10 days since your symptoms first started.  Prevention  Cover your mouth and nose with a tissue when you cough or sneeze. Throw used tissues in a lined trash can right away, and wash your hands immediately after.  Avoid sharing personal items like dishes, utensils, towels, or bedding. Wash items thoroughly with soap and water after use.  Wash your hands often with soap and water for at least 20 seconds. If soap and water are not available, clean your hands with a hand  that contains at least 60% alcohol. Cover all surfaces of your hands and rub them together until they feel dry.  Avoid touching your face, especially your eyes, nose, and  "mouth.  Clean \"high-touch\" surfaces daily. \"High-touch\" surfaces include counters, tabletops, doorknobs, bathroom fixtures, toilets, phones, keyboards, tablets, and bedside tables. You can use soap, detergents, 60%-80% ethanol or isopropyl alcohol,  such as Windex, or bleach. All of these  are effective at killing the virus that causes COVID-19.  Limit contact with pets and other animals while sick. If you must care for your pet, wash your hands before and after you interact with them and wear a face mask.  What to expect  Follow the advice in the treatment section below and you should feel better within 7 to 14 days. You may continue to feel tired and have a cough for several weeks.    About your diagnosis  Common symptoms of COVID-19 include fever, cough, shortness of breath, fatigue, muscle or body aches, headaches, new loss of sense of taste or smell, sore throat, stuffy or runny nose, nausea or vomiting, and diarrhea. Most people who get COVID-19 have mild symptoms and can rest at home until they get better. Elderly people and those with chronic medical problems may be at risk for more serious complications.    Call your healthcare provider immediately if you have any of the following:  Fever over 103F  Fever that doesn't come down when you take Tylenol or ibuprofen  Fever that returns after being gone for more than 24 hours  Fever for more than 4 days  Worsening shortness of breath or difficulty breathing  Go to your nearest ER or call 911 if you have any of the following:  Shortness of breath that makes it difficult to do simple things like get dressed, bathe, or comb your hair  Persistent chest pain or chest tightness  New confusion or difficulty staying alert  Bluish color to the lips or face  Other treatment  Rest and drink plenty of sugar-free fluids.  Gargle with salt water several times a day to help your throat feel better. Cough drops and throat lozenges may provide extra relief. " A teaspoon of honey stirred into warm water or weak tea can help soothe a sore throat and cough.  If your nose or sinuses become very stuffy, try using a Neti Pot to flush them out. Neti Pots are available at any drugstore without a prescription.  Avoid smoke and air pollution. Smoke can make infections worse.      This is likely a viral illness. Viral illnesses do not respond to antibiotics. It is best to treat viruses with rest and drinking plenty of fluids. Over the counter medications can help ease symptoms.        Nirmatrelvir; Ritonavir Tablets  What is this medication?  NIRMATRELVIR; RITONAVIR (TEDDY ma TREL vir; ri TOE na veer) treats mild to moderate COVID-19. It may help people who are at high risk of developing severe illness. It works by limiting the spread of the virus in your body.  This medicine may be used for other purposes; ask your health care provider or pharmacist if you have questions.  COMMON BRAND NAME(S): PAXLOVID  What should I tell my care team before I take this medication?  They need to know if you have any of these conditions:  Any allergies  Any serious illness  Kidney disease  Liver disease  An unusual or allergic reaction to nirmatrelvir, ritonavir, other medications, foods, dyes, or preservatives  Pregnant or trying to get pregnant  Breast-feeding  How should I use this medication?  This product contains 2 different medications that are packaged together. For the standard dose, take 2 pink tablets of nirmatrelvir with 1 white tablet of ritonavir (3 tablets total) by mouth with water twice daily. Talk to your care team if you have kidney disease. You may need a different dose. Swallow the tablets whole. You can take it with or without food. If it upsets your stomach, take it with food. Take all of this medication unless your care team tells you to stop it early. Keep taking it even if you think you are better.  Talk to your care team about the use of this medication in children. While  it may be prescribed for children as young as 12 years for selected conditions, precautions do apply.  Overdosage: If you think you have taken too much of this medicine contact a poison control center or emergency room at once.  NOTE: This medicine is only for you. Do not share this medicine with others.  What if I miss a dose?  If you miss a dose, take it as soon as you can unless it is more than 8 hours late. If it is more than 8 hours late, skip the missed dose. Take the next dose at the normal time. Do not take extra or 2 doses at the same time to make up for the missed dose.  What may interact with this medication?  Do not take this medication with any of the following:  Alfuzosin  Certain medications for anxiety or sleep, such as midazolam or triazolam  Certain medications for cancer, such as apalutamide  Certain medications for cholesterol, such as lovastatin or simvastatin  Certain medications for irregular heartbeat, such as amiodarone, dronedarone, flecainide, propafenone, quinidine  Certain medications for mental health conditions, such as lurasidone or pimozide  Certain medications for seizures, such as carbamazepine, phenobarbital, phenytoin, primidone  Colchicine  Eletriptan  Eplerenone  Ergot alkaloids, such as dihydroergotamine, ergotamine, methylergonovine  Finerenone  Flibanserin  Ivabradine  Lomitapide  Lumacaftor; ivacaftor  Naloxegol  Ranolazine  Red Yeast Rice  Rifampin  Rifapentine  Sildenafil  Silodosin  Blue Jay's wort  Tolvaptan  Ubrogepant  Voclosporin  This medication may affect how other medications work, and other medications may affect the way this medication works. Talk with your care team about all of the medications you take. They may suggest changes to your treatment plan to lower the risk of side effects and to make sure your medications work as intended.  This list may not describe all possible interactions. Give your health care provider a list of all the medicines, herbs,  non-prescription drugs, or dietary supplements you use. Also tell them if you smoke, drink alcohol, or use illegal drugs. Some items may interact with your medicine.  What should I watch for while using this medication?  Your condition will be monitored carefully while you are receiving this medication. Visit your care team for regular checkups. Tell your care team if your symptoms do not start to get better or if they get worse.  If you have untreated HIV infection, this medication may lead to some HIV medications not working as well in the future.  Estrogen and progestin hormones may not work as well while you are taking this medication. Your care team can help you find the contraceptive option that works for you.  What side effects may I notice from receiving this medication?  Side effects that you should report to your care team as soon as possible:  Allergic reactions--skin rash, itching, hives, swelling of the face, lips, tongue, or throat  Liver injury--right upper belly pain, loss of appetite, nausea, light-colored stool, dark yellow or brown urine, yellowing skin or eyes, unusual weakness or fatigue  Redness, blistering, peeling, or loosening of the skin, including inside the mouth  Side effects that usually do not require medical attention (report these to your care team if they continue or are bothersome):  Change in taste  Diarrhea  General discomfort and fatigue  Increase in blood pressure  Muscle pain  Nausea  Stomach pain  This list may not describe all possible side effects. Call your doctor for medical advice about side effects. You may report side effects to FDA at 8-617-FDA-9853.  Where should I keep my medication?  Keep out of the reach of children and pets.  Store at room temperature between 20 and 25 degrees C (68 and 77 degrees F). Get rid of any unused medication after the expiration date.  To get rid of medications that are no longer needed or have :  Take the medication to a  medication take-back program. Check with your pharmacy or law enforcement to find a location.  If you cannot return the medication, check the label or package insert to see if the medication should be thrown out in the garbage or flushed down the toilet. If you are not sure, ask your care team. If it is safe to put it in the trash, take the medication out of the container. Mix the medication with cat litter, dirt, coffee grounds, or other unwanted substance. Seal the mixture in a bag or container. Put it in the trash.  NOTE: This sheet is a summary. It may not cover all possible information. If you have questions about this medicine, talk to your doctor, pharmacist, or health care provider.  © 2024 [employer] (2024-02-25 00:00:00)

## 2024-09-24 ENCOUNTER — TELEPHONE (OUTPATIENT)
Dept: GASTROENTEROLOGY | Facility: CLINIC | Age: 34
End: 2024-09-24
Payer: COMMERCIAL

## 2024-09-24 NOTE — PRE-PROCEDURE INSTRUCTIONS
Got voicemail but has not listened to it yet.  Is on way to appt and will listen after that and will then call back to confirm understanding.

## 2024-09-30 ENCOUNTER — TELEPHONE (OUTPATIENT)
Dept: GASTROENTEROLOGY | Facility: CLINIC | Age: 34
End: 2024-09-30
Payer: COMMERCIAL

## 2024-10-01 ENCOUNTER — HOSPITAL ENCOUNTER (OUTPATIENT)
Facility: HOSPITAL | Age: 34
Setting detail: HOSPITAL OUTPATIENT SURGERY
Discharge: HOME | End: 2024-10-01
Attending: INTERNAL MEDICINE | Admitting: INTERNAL MEDICINE
Payer: COMMERCIAL

## 2024-10-01 VITALS
WEIGHT: 108.47 LBS | TEMPERATURE: 98 F | HEART RATE: 57 BPM | DIASTOLIC BLOOD PRESSURE: 75 MMHG | BODY MASS INDEX: 19.21 KG/M2 | SYSTOLIC BLOOD PRESSURE: 105 MMHG | OXYGEN SATURATION: 100 % | RESPIRATION RATE: 18 BRPM

## 2024-10-01 DIAGNOSIS — K62.5 RECTAL BLEEDING: ICD-10-CM

## 2024-10-01 LAB — B-HCG UR QL: NEGATIVE

## 2024-10-01 PROCEDURE — 25010000002 PROPOFOL 10 MG/ML EMULSION: Performed by: NURSE ANESTHETIST, CERTIFIED REGISTERED

## 2024-10-01 PROCEDURE — 25810000003 LACTATED RINGERS PER 1000 ML: Performed by: INTERNAL MEDICINE

## 2024-10-01 PROCEDURE — 25010000002 LIDOCAINE PF 2% 2 % SOLUTION: Performed by: NURSE ANESTHETIST, CERTIFIED REGISTERED

## 2024-10-01 PROCEDURE — 81025 URINE PREGNANCY TEST: CPT | Performed by: INTERNAL MEDICINE

## 2024-10-01 PROCEDURE — 88305 TISSUE EXAM BY PATHOLOGIST: CPT | Performed by: INTERNAL MEDICINE

## 2024-10-01 RX ORDER — HYDROCORTISONE 25 MG/G
CREAM TOPICAL 2 TIMES DAILY
Qty: 28 G | Refills: 0 | Status: SHIPPED | OUTPATIENT
Start: 2024-10-01 | End: 2024-10-15

## 2024-10-01 RX ORDER — SODIUM CHLORIDE, SODIUM LACTATE, POTASSIUM CHLORIDE, CALCIUM CHLORIDE 600; 310; 30; 20 MG/100ML; MG/100ML; MG/100ML; MG/100ML
30 INJECTION, SOLUTION INTRAVENOUS CONTINUOUS
Status: DISCONTINUED | OUTPATIENT
Start: 2024-10-01 | End: 2024-10-01 | Stop reason: HOSPADM

## 2024-10-01 RX ORDER — LIDOCAINE HYDROCHLORIDE 20 MG/ML
INJECTION, SOLUTION EPIDURAL; INFILTRATION; INTRACAUDAL; PERINEURAL AS NEEDED
Status: DISCONTINUED | OUTPATIENT
Start: 2024-10-01 | End: 2024-10-01 | Stop reason: SURG

## 2024-10-01 RX ORDER — PROPOFOL 10 MG/ML
VIAL (ML) INTRAVENOUS AS NEEDED
Status: DISCONTINUED | OUTPATIENT
Start: 2024-10-01 | End: 2024-10-01 | Stop reason: SURG

## 2024-10-01 RX ORDER — VENLAFAXINE HYDROCHLORIDE 37.5 MG/1
37.5 CAPSULE, EXTENDED RELEASE ORAL DAILY
COMMUNITY

## 2024-10-01 RX ADMIN — SODIUM CHLORIDE, POTASSIUM CHLORIDE, SODIUM LACTATE AND CALCIUM CHLORIDE 30 ML/HR: 600; 310; 30; 20 INJECTION, SOLUTION INTRAVENOUS at 11:44

## 2024-10-01 RX ADMIN — LIDOCAINE HYDROCHLORIDE 60 MG: 20 INJECTION, SOLUTION EPIDURAL; INFILTRATION; INTRACAUDAL; PERINEURAL at 12:05

## 2024-10-01 RX ADMIN — PROPOFOL 250 MCG/KG/MIN: 10 INJECTION, EMULSION INTRAVENOUS at 12:05

## 2024-10-01 RX ADMIN — PROPOFOL 100 MG: 10 INJECTION, EMULSION INTRAVENOUS at 12:05

## 2024-10-01 NOTE — H&P
Pre Procedure History & Physical    Chief Complaint:   Rectal bleeding, constipation, diarrhea, periumbilical abd pain    Subjective     HPI:   33 yo F here for rectal bleeding, constipation, diarrhea, periumbilical abd pain.    Past Medical History:   Past Medical History:   Diagnosis Date    Allergic     PONV (postoperative nausea and vomiting)        Past Surgical History:  Past Surgical History:   Procedure Laterality Date    CERVICAL CONIZATION      DILATATION AND CURETTAGE N/A 6/21/2022    Procedure: DILATATION AND CURETTAGE suction;  Surgeon: Tammy Eng MD;  Location: [facility] MAIN OR;  Service: Gynecology;  Laterality: N/A;    KNEE ARTHROSCOPY Right     x 2    LEEP         Family History:  Family History   Problem Relation Age of Onset    Diabetes Mother     Lung cancer Mother     Other (Brain tumor) Mother     Malig Hyperthermia Neg Hx        Social History:   reports that she has quit smoking. Her smoking use included cigarettes. She has never used smokeless tobacco. She reports that she does not currently use alcohol. She reports that she does not currently use drugs after having used the following drugs: Marijuana.    Medications:   Medications Prior to Admission   Medication Sig Dispense Refill Last Dose    clonazePAM (KlonoPIN) 0.5 MG tablet    9/30/2024    clonazePAM (KlonoPIN) 1 MG tablet Take 1 tablet by mouth 2 (Two) Times a Day As Needed for Anxiety.   Past Week    ibuprofen (ADVIL,MOTRIN) 600 MG tablet    Past Month    mupirocin (BACTROBAN) 2 % ointment Apply 1 application topically to the appropriate area as directed 3 (Three) Times a Day. 1 each 0 Past Week    Pain Reliever Extra Strength 500 MG tablet    Past Month    QUEtiapine (SEROquel) 25 MG tablet    Past Week    venlafaxine XR (EFFEXOR-XR) 37.5 MG 24 hr capsule Take 1 capsule by mouth Daily.   9/30/2024    dicyclomine (BENTYL) 20 MG tablet Take 1 tablet by mouth Every 8 (Eight) Hours As Needed for Abdominal Cramping. (Patient  not taking: Reported on 7/31/2024) 12 tablet 0 Unknown    omeprazole (priLOSEC) 40 MG capsule Take 1 capsule by mouth Daily. (Patient not taking: Reported on 7/31/2024) 14 capsule 0 Unknown    ondansetron ODT (ZOFRAN-ODT) 4 MG disintegrating tablet Place 1 tablet on the tongue Every 6 (Six) Hours As Needed for Vomiting. (Patient not taking: Reported on 7/31/2024) 15 tablet 0 Unknown       Allergies:  Amoxicillin-pot clavulanate    ROS:    Pertinent items are noted in HPI     Objective     Blood pressure 99/71, pulse 58, temperature 97.4 °F (36.3 °C), temperature source Temporal, resp. rate 16, weight 49.2 kg (108 lb 7.5 oz), SpO2 99%, not currently breastfeeding.    Physical Exam   Constitutional: Pt is oriented to person, place, and time and well-developed, well-nourished, and in no distress.   Mouth/Throat: Oropharynx is clear and moist.   Neck: Normal range of motion.   Cardiovascular: Normal rate, regular rhythm and normal heart sounds.    Pulmonary/Chest: Effort normal and breath sounds normal.   Abdominal: Soft. Nontender  Skin: Skin is warm and dry.   Psychiatric: Mood, memory, affect and judgment normal.     Assessment & Plan     Diagnosis:  Rectal bleeding, constipation, diarrhea, periumbilical abd pain    Anticipated Surgical Procedure:  Colonoscopy    The risks, benefits, and alternatives of this procedure have been discussed with the patient or the responsible party- the patient understands and agrees to proceed.

## 2024-10-01 NOTE — ANESTHESIA POSTPROCEDURE EVALUATION
Patient: Tanika Ash    Procedure Summary       Date: 10/01/24 Room / Location: [facility] ENDOSCOPY 1 / [facility] ENDOSCOPY    Anesthesia Start: 1202 Anesthesia Stop: 1223    Procedure: COLONOSCOPY WITH BIOPSIES Diagnosis:       Rectal bleeding      (Rectal bleeding [K62.5])    Surgeons: Tiffany Diaz MD Provider: Eric Dela Cruz CRNA    Anesthesia Type: general ASA Status: 2            Anesthesia Type: general    Vitals  Vitals Value Taken Time   /75 10/01/24 1239   Temp 36.7 °C (98 °F) 10/01/24 1220   Pulse 57 10/01/24 1239   Resp 18 10/01/24 1239   SpO2 100 % 10/01/24 1239           Post Anesthesia Care and Evaluation    Post-procedure mental status: acceptable.  Pain management: satisfactory to patient    Airway patency: patent  Anesthetic complications: No anesthetic complications    Cardiovascular status: acceptable  Respiratory status: acceptable, spontaneous ventilation and room air  Hydration status: acceptable    Comments: Per chart review

## 2024-10-04 ENCOUNTER — TELEPHONE (OUTPATIENT)
Dept: GASTROENTEROLOGY | Facility: CLINIC | Age: 34
End: 2024-10-04
Payer: COMMERCIAL

## 2024-10-08 ENCOUNTER — TELEPHONE (OUTPATIENT)
Dept: GASTROENTEROLOGY | Facility: CLINIC | Age: 34
End: 2024-10-08
Payer: COMMERCIAL

## 2024-10-08 NOTE — TELEPHONE ENCOUNTER
----- Message from Albania Montoya sent at 10/2/2024 12:31 PM EDT -----  Colon biopsies benign.  No evidence of microscopic colitis.  Recall colonoscopy in 10 years.  Patient to follow-up in the office in 6 to 8 weeks.

## 2025-06-19 ENCOUNTER — TELEMEDICINE (OUTPATIENT)
Dept: FAMILY MEDICINE CLINIC | Facility: TELEHEALTH | Age: 35
End: 2025-06-19
Payer: COMMERCIAL

## 2025-06-19 DIAGNOSIS — B96.89 BACTERIAL VAGINOSIS: Primary | ICD-10-CM

## 2025-06-19 DIAGNOSIS — N76.0 BACTERIAL VAGINOSIS: Primary | ICD-10-CM

## 2025-06-19 DIAGNOSIS — B37.31 VAGINAL YEAST INFECTION: ICD-10-CM

## 2025-06-19 RX ORDER — FLUCONAZOLE 150 MG/1
TABLET ORAL
Qty: 2 TABLET | Refills: 0 | Status: SHIPPED | OUTPATIENT
Start: 2025-06-19

## 2025-06-19 RX ORDER — VENLAFAXINE HYDROCHLORIDE 150 MG/1
CAPSULE, EXTENDED RELEASE ORAL
COMMUNITY
Start: 2025-05-19

## 2025-06-19 RX ORDER — NYSTATIN 100000 U/G
1 CREAM TOPICAL 2 TIMES DAILY
Qty: 30 G | Refills: 0 | Status: SHIPPED | OUTPATIENT
Start: 2025-06-19

## 2025-06-19 RX ORDER — METRONIDAZOLE 500 MG/1
500 TABLET ORAL 2 TIMES DAILY
Qty: 14 TABLET | Refills: 0 | Status: SHIPPED | OUTPATIENT
Start: 2025-06-19 | End: 2025-06-26

## 2025-06-19 NOTE — PROGRESS NOTES
You have chosen to receive care through a telehealth visit.  Do you consent to use a video/audio connection for your medical care today? Yes     Patient or patient representative verbalized consent for the use of Ambient Listening during the visit with  STEPHAN Rodriguez for chart documentation. 6/19/2025  09:10 EDT    CHIEF COMPLAINT  No chief complaint on file.        HPI  History of Present Illness  The patient is a 35-year-old female presenting with complaints of bacterial vaginosis (BV) and a possible yeast infection.    She reports experiencing mild symptoms of BV, including an unpleasant odor, which began yesterday. She has been sexually active with multiple partners, which is not her usual behavior due to the associated risk of BV. Typically, she manages her BV symptoms with antibiotics, which effectively alleviate the odor.    Additionally, she describes severe itching in the vaginal area, which she believes may be indicative of a yeast infection. She attempted to self-treat with a boric acid rinse, but this resulted in increased itching. Her usual symptom of a yeast infection is itching, but she notes that the current level of itching is unusually intense. She has a history of yeast infections during pregnancy, with only one or two occurrences outside of this context.       Review of Systems  See HPI    Past Medical History:   Diagnosis Date    Allergic        Family History   Problem Relation Age of Onset    Diabetes Mother     Lung cancer Mother     Other (Brain tumor) Mother     Malig Hyperthermia Neg Hx        Social History     Socioeconomic History    Marital status: Single   Tobacco Use    Smoking status: Former     Current packs/day: 0.25     Types: Cigarettes    Smokeless tobacco: Never    Tobacco comments:     8/20/24 quit 4-5 years ago          Currently uses cigarellos    Vaping Use    Vaping status: Former   Substance and Sexual Activity    Alcohol use: Not Currently     Comment: rarely     Drug use: Not Currently     Types: Marijuana     Comment: occ    Sexual activity: Defer       Tanika Isabela Ash  reports that she has quit smoking. Her smoking use included cigarettes. She has never used smokeless tobacco.             There were no vitals taken for this visit.    PHYSICAL EXAM  Physical Exam   Constitutional: She is oriented to person, place, and time. She appears well-developed and well-nourished. She does not have a sickly appearance. She does not appear ill.   HENT:   Head: Normocephalic and atraumatic.   Pulmonary/Chest: Effort normal.  No respiratory distress.  Neurological: She is alert and oriented to person, place, and time.           Diagnoses and all orders for this visit:    1. Bacterial vaginosis (Primary)  -     metroNIDAZOLE (FLAGYL) 500 MG tablet; Take 1 tablet by mouth 2 (Two) Times a Day for 7 days.  Dispense: 14 tablet; Refill: 0    2. Vaginal yeast infection  -     fluconazole (DIFLUCAN) 150 MG tablet; Take 1 tablet now, repeat in 72 hours if symptoms continue  Dispense: 2 tablet; Refill: 0  -     nystatin (MYCOSTATIN) 193211 UNIT/GM cream; Apply 1 Application topically to the appropriate area as directed 2 (Two) Times a Day.  Dispense: 30 g; Refill: 0    --take medications as prescribed  --increase fluids, rest as needed, tylenol or ibuprofen for pain  --f/u in 5-7 days if no improvement      Assessment & Plan  1. Bacterial vaginosis.  - Complaints of mild odor and itching.  - Increased itching and discomfort.  - Advised to complete the full course of antibiotics to prevent resistance.  - Metronidazole 500 mg prescribed, to be taken twice daily for 7 days.    2. Suspected yeast infection.  - Symptoms of itching following boric acid rinse.  - Increased itching and discomfort.  - Two fluconazole 150 mg tablets prescribed; one to be taken immediately and the second tablet three days later.  - Nystatin cream provided for topical application twice daily to alleviate itching and  discomfort.         FOLLOW-UP  As discussed during visit with PCP/Virtual Care if no improvement or Urgent Care/Emergency Department if worsening of symptoms    Patient verbalizes understanding of medication dosage, comfort measures, instructions for treatment and follow-up.    Mar Leslie, STEPHAN  06/19/2025  09:10 EDT    Mode of Visit: Video  Location of patient: -HOME-  Location of provider: +HOME+  You have chosen to receive care through a telehealth visit.  The patient has signed the video visit consent form.  The visit included audio and video interaction. No technical issues occurred during this visit.    The use of a video visit has been reviewed with the patient and verbal informed consent has been obtained. Myself and Tanika Ash     participated in this visit. The patient is located in [address] [place] [address]  I am located in [place], [place]. [employer] and [employer] were utilized. I spent 1 minutes in the patient's chart for this visit.      Note Disclaimer: At [facility], we believe that sharing information builds trust and better   relationships. You are receiving this note because you recently visited [facility]. It is possible you   will see health information before a provider has talked with you about it. This kind of information can   be easy to misunderstand. To help you fully understand what it means for your health, we urge you to   discuss this note with your provider.

## 2025-07-16 ENCOUNTER — TELEMEDICINE (OUTPATIENT)
Dept: FAMILY MEDICINE CLINIC | Facility: TELEHEALTH | Age: 35
End: 2025-07-16
Payer: COMMERCIAL

## 2025-07-16 DIAGNOSIS — B08.4 HAND, FOOT AND MOUTH DISEASE: Primary | ICD-10-CM

## 2025-07-16 RX ORDER — BROMPHENIRAMINE MALEATE, PSEUDOEPHEDRINE HYDROCHLORIDE, AND DEXTROMETHORPHAN HYDROBROMIDE 2; 30; 10 MG/5ML; MG/5ML; MG/5ML
10 SYRUP ORAL 4 TIMES DAILY PRN
Qty: 240 ML | Refills: 0 | Status: SHIPPED | OUTPATIENT
Start: 2025-07-16

## 2025-07-16 NOTE — LETTER
July 16, 2025     Patient: Tanika Ash   YOB: 1990   Date of Visit: 7/16/2025       To Whom It May Concern:    Please excuse Tanika Ash from 7/16/2025 through 7/18/2025. It is my medical opinion that she may return to normal activity when fever free for 24 hours and symptoms have improved.            Sincerely,    STEPHAN Vale    CC: No Recipients

## 2025-07-16 NOTE — PROGRESS NOTES
Subjective   Chief Complaint   Patient presents with    URI    Rash       Tanika Ash is a 35 y.o. female.     History of Present Illness  Patient reports headache, sinus congestion, fatigue, chills, sweating and feeling feverish.  Symptoms started a few days ago after caring for her son who was recently diagnosed with hand-foot-and-mouth.  She states she initially thought he was having an allergic reaction to a new soap so she rubbed him down with hydrocortisone cream having close contact with blisters.  Shortly after she started to feel unwell.  She does report having a small pruritic bump on her right hand between the fingers and once on her right forearm.  No oral lesions at this time.  URI   This is a new problem. Episode onset: few days. The problem has been unchanged. Maximum temperature: tactile. Associated symptoms include congestion, coughing, headaches, joint pain and a rash. Pertinent negatives include no sinus pain, sore throat, vomiting or wheezing. She has tried sleep for the symptoms.   Rash  Associated symptoms: congestion, cough, fatigue, fever and joint pain    Associated symptoms: no shortness of breath, no sore throat and no vomiting         Allergies   Allergen Reactions    Amoxicillin-Pot Clavulanate GI Intolerance and Nausea And Vomiting       Past Medical History:   Diagnosis Date    Allergic        Past Surgical History:   Procedure Laterality Date    CERVICAL CONIZATION      COLONOSCOPY N/A 10/1/2024    Procedure: COLONOSCOPY WITH BIOPSIES;  Surgeon: Tiffany Diaz MD;  Location: [facility] ENDOSCOPY;  Service: Gastroenterology;  Laterality: N/A;  HEMORRHOIDS    DILATATION AND CURETTAGE N/A 6/21/2022    Procedure: DILATATION AND CURETTAGE suction;  Surgeon: Tammy Eng MD;  Location: [facility] MAIN OR;  Service: Gynecology;  Laterality: N/A;    KNEE ARTHROSCOPY Right     x 2    LEEP         Social History     Socioeconomic History    Marital status: Single   Tobacco  Use    Smoking status: Former     Current packs/day: 0.25     Types: Cigarettes    Smokeless tobacco: Never    Tobacco comments:     8/20/24 quit 4-5 years ago          Currently uses cigarellos    Vaping Use    Vaping status: Former   Substance and Sexual Activity    Alcohol use: Not Currently     Comment: rarely    Drug use: Not Currently     Types: Marijuana     Comment: occ    Sexual activity: Defer       Family History   Problem Relation Age of Onset    Diabetes Mother     Lung cancer Mother     Other (Brain tumor) Mother     Malig Hyperthermia Neg Hx          Current Outpatient Medications:     brompheniramine-pseudoephedrine-DM 30-2-10 MG/5ML syrup, Take 10 mL by mouth 4 (Four) Times a Day As Needed for Congestion or Cough., Disp: 240 mL, Rfl: 0    clonazePAM (KlonoPIN) 1 MG tablet, Take 1 tablet by mouth 2 (Two) Times a Day As Needed for Anxiety., Disp: , Rfl:     fluconazole (DIFLUCAN) 150 MG tablet, Take 1 tablet now, repeat in 72 hours if symptoms continue, Disp: 2 tablet, Rfl: 0    ibuprofen (ADVIL,MOTRIN) 600 MG tablet, , Disp: , Rfl:     mupirocin (BACTROBAN) 2 % ointment, Apply 1 application topically to the appropriate area as directed 3 (Three) Times a Day., Disp: 1 each, Rfl: 0    nystatin (MYCOSTATIN) 501035 UNIT/GM cream, Apply 1 Application topically to the appropriate area as directed 2 (Two) Times a Day., Disp: 30 g, Rfl: 0    Pain Reliever Extra Strength 500 MG tablet, , Disp: , Rfl:     QUEtiapine (SEROquel) 25 MG tablet, , Disp: , Rfl:     venlafaxine XR (EFFEXOR-XR) 150 MG 24 hr capsule, , Disp: , Rfl:       Review of Systems   Constitutional:  Positive for activity change, chills, diaphoresis, fatigue and fever.   HENT:  Positive for congestion. Negative for sore throat.    Respiratory:  Positive for cough. Negative for chest tightness, shortness of breath and wheezing.    Gastrointestinal:  Negative for vomiting.   Musculoskeletal:  Positive for joint pain.   Skin:  Positive for rash and  skin lesions.   Neurological:  Positive for headache.        There were no vitals filed for this visit.    Objective   Physical Exam  Constitutional:       General: She is not in acute distress.     Appearance: Normal appearance. She is not ill-appearing, toxic-appearing or diaphoretic.   HENT:      Head: Normocephalic.      Nose: Congestion present.      Mouth/Throat:      Lips: Pink.      Mouth: Mucous membranes are moist. No oral lesions (per pt).   Pulmonary:      Effort: Pulmonary effort is normal.   Skin:     Findings: Rash present. Rash is papular.          Neurological:      Mental Status: She is alert and oriented to person, place, and time.          Procedures     Assessment & Plan   Diagnoses and all orders for this visit:    1. Hand, foot and mouth disease (Primary)  -     brompheniramine-pseudoephedrine-DM 30-2-10 MG/5ML syrup; Take 10 mL by mouth 4 (Four) Times a Day As Needed for Congestion or Cough.  Dispense: 240 mL; Refill: 0      Alternate tylenol and motrin for pain and/or fever, stay hydrated and rest.     If symptoms worsen or do not improve follow up with your PCP or visit your nearest Urgent Care Center or ER.      PLAN: Discussed dosing, side effects, recommended other symptomatic care.  Patient should follow up with primary care provider, Urgent Care or ER if symptoms worsen, fail to resolve or other symptoms need attention. Patient/family agree to the above.         STEPHAN Vale     Mode of Visit: Video  Location of patient: -HOME-  Location of provider: +HOME+  You have chosen to receive care through a telehealth visit.  The patient has signed the video visit consent form.  The visit included audio and video interaction. No technical issues occurred during this visit.    The use of a video visit has been reviewed with the patient and verbal informed consent has been obtained. Myself and Tanika Ash participated in this visit. The patient is located at [address] [place] [address]  [place] [address]. I am located in [place], [place]. Pixel[employer] and Zoom were utilized.        This visit was performed via Telehealth.  This patient has been instructed to follow-up with their primary care provider if their symptoms worsen or the treatment provided does not resolve their illness.

## (undated) DEVICE — Device

## (undated) DEVICE — SOLIDIFIER LIQLOC PLS 1500CC BT

## (undated) DEVICE — LITHOTOMY-SLINGS: Brand: MEDLINE INDUSTRIES, INC.

## (undated) DEVICE — LIGHT SLEEVE: Brand: DEVON

## (undated) DEVICE — GOWN,REINFORCE,POLY,SIRUS,BREATH SLV,XLG: Brand: MEDLINE

## (undated) DEVICE — TOWEL,OR,DSP,ST,BLUE,STD,4/PK,20PK/CS: Brand: MEDLINE

## (undated) DEVICE — VAGINAL PREP TRAY: Brand: MEDLINE INDUSTRIES, INC.

## (undated) DEVICE — SOL IRRG H2O PL/BG 1000ML STRL

## (undated) DEVICE — CATH URETH INTRMIT ALLPURP LTX 16F RED

## (undated) DEVICE — Device: Brand: DEFENDO AIR/WATER/SUCTION AND BIOPSY VALVE

## (undated) DEVICE — SINGLE-USE BIOPSY FORCEPS: Brand: RADIAL JAW 4

## (undated) DEVICE — LINER SURG CANSTR SXN S/RIGD 1500CC

## (undated) DEVICE — GLV SURG BIOGEL LTX PF 6 1/2

## (undated) DEVICE — CONN JET HYDRA H20 AUXILIARY DISP